# Patient Record
Sex: MALE | Race: OTHER | NOT HISPANIC OR LATINO | URBAN - METROPOLITAN AREA
[De-identification: names, ages, dates, MRNs, and addresses within clinical notes are randomized per-mention and may not be internally consistent; named-entity substitution may affect disease eponyms.]

---

## 2021-06-09 ENCOUNTER — OUTPATIENT (OUTPATIENT)
Dept: OUTPATIENT SERVICES | Facility: HOSPITAL | Age: 46
LOS: 1 days | End: 2021-06-09
Payer: COMMERCIAL

## 2021-06-09 ENCOUNTER — APPOINTMENT (OUTPATIENT)
Dept: RADIOLOGY | Facility: HOSPITAL | Age: 46
End: 2021-06-09
Payer: COMMERCIAL

## 2021-06-09 DIAGNOSIS — K21.9 GASTRO-ESOPHAGEAL REFLUX DISEASE WITHOUT ESOPHAGITIS: ICD-10-CM

## 2021-06-09 PROCEDURE — 74240 X-RAY XM UPR GI TRC 1CNTRST: CPT | Mod: 26

## 2021-06-09 PROCEDURE — 74240 X-RAY XM UPR GI TRC 1CNTRST: CPT

## 2021-07-22 ENCOUNTER — NON-APPOINTMENT (OUTPATIENT)
Age: 46
End: 2021-07-22

## 2021-09-08 ENCOUNTER — NON-APPOINTMENT (OUTPATIENT)
Age: 46
End: 2021-09-08

## 2021-09-13 ENCOUNTER — NON-APPOINTMENT (OUTPATIENT)
Age: 46
End: 2021-09-13

## 2021-09-13 DIAGNOSIS — Z01.818 ENCOUNTER FOR OTHER PREPROCEDURAL EXAMINATION: ICD-10-CM

## 2021-09-13 DIAGNOSIS — K44.9 DIAPHRAGMATIC HERNIA W/OUT OBSTRUCTION OR GANGRENE: ICD-10-CM

## 2021-09-13 DIAGNOSIS — K21.9 GASTRO-ESOPHAGEAL REFLUX DISEASE W/OUT ESOPHAGITIS: ICD-10-CM

## 2021-09-20 ENCOUNTER — OUTPATIENT (OUTPATIENT)
Dept: OUTPATIENT SERVICES | Facility: HOSPITAL | Age: 46
LOS: 1 days | Discharge: HOME | End: 2021-09-20
Payer: COMMERCIAL

## 2021-09-20 ENCOUNTER — APPOINTMENT (OUTPATIENT)
Dept: GASTROENTEROLOGY | Facility: HOSPITAL | Age: 46
End: 2021-09-20

## 2021-09-20 VITALS
HEART RATE: 66 BPM | TEMPERATURE: 99 F | SYSTOLIC BLOOD PRESSURE: 118 MMHG | RESPIRATION RATE: 20 BRPM | OXYGEN SATURATION: 99 % | DIASTOLIC BLOOD PRESSURE: 73 MMHG

## 2021-09-20 VITALS
WEIGHT: 160.06 LBS | HEIGHT: 72 IN | TEMPERATURE: 97 F | OXYGEN SATURATION: 99 % | DIASTOLIC BLOOD PRESSURE: 73 MMHG | SYSTOLIC BLOOD PRESSURE: 118 MMHG

## 2021-09-20 DIAGNOSIS — K44.9 DIAPHRAGMATIC HERNIA WITHOUT OBSTRUCTION OR GANGRENE: ICD-10-CM

## 2021-09-20 PROCEDURE — 91037 ESOPH IMPED FUNCTION TEST: CPT | Mod: 26,GC

## 2021-09-20 PROCEDURE — 91010 ESOPHAGUS MOTILITY STUDY: CPT | Mod: 26,GC

## 2021-09-20 NOTE — ASU PATIENT PROFILE, ADULT - PATIENT REPRESENTATIVE: ( YOU CAN CHOOSE ANY PERSON THAT CAN ASSIST YOU WITH YOUR HEALTH CARE PREFERENCES, DOES NOT HAVE TO BE A SPOUSE, IMMEDIATE FAMILY OR SIGNIFICANT OTHER/PARTNER)
"Anesthesia Transfer of Care Note    Patient: Indira Chauhan    Procedure(s) Performed: Procedure(s) (LRB):  Fnfzcbmij-Gurh-I-Cath (Right)    Patient location: PACU    Anesthesia Type: MAC    Transport from OR: Transported from OR on 2-3 L/min O2 by NC with adequate spontaneous ventilation    Post pain: adequate analgesia    Post assessment: no apparent anesthetic complications and tolerated procedure well    Post vital signs: stable    Level of consciousness: awake    Nausea/Vomiting: no nausea/vomiting    Complications: none    Transfer of care protocol was followed      Last vitals:   Visit Vitals  BP (!) 116/58   Pulse 71   Temp 36.7 °C (98.1 °F) (Skin)   Resp 16   Ht 5' 5" (1.651 m)   Wt 99.8 kg (220 lb)   SpO2 96%   Breastfeeding? No   BMI 36.61 kg/m²     " Declines

## 2021-09-24 ENCOUNTER — RESULT REVIEW (OUTPATIENT)
Age: 46
End: 2021-09-24

## 2021-09-24 ENCOUNTER — APPOINTMENT (OUTPATIENT)
Dept: GASTROENTEROLOGY | Facility: HOSPITAL | Age: 46
End: 2021-09-24

## 2021-09-24 ENCOUNTER — OUTPATIENT (OUTPATIENT)
Dept: OUTPATIENT SERVICES | Facility: HOSPITAL | Age: 46
LOS: 1 days | Discharge: HOME | End: 2021-09-24
Payer: COMMERCIAL

## 2021-09-24 VITALS
HEIGHT: 72 IN | WEIGHT: 160.06 LBS | TEMPERATURE: 98 F | HEART RATE: 56 BPM | DIASTOLIC BLOOD PRESSURE: 81 MMHG | SYSTOLIC BLOOD PRESSURE: 123 MMHG | OXYGEN SATURATION: 99 % | RESPIRATION RATE: 12 BRPM

## 2021-09-24 VITALS
RESPIRATION RATE: 18 BRPM | OXYGEN SATURATION: 98 % | SYSTOLIC BLOOD PRESSURE: 106 MMHG | DIASTOLIC BLOOD PRESSURE: 79 MMHG | HEART RATE: 60 BPM

## 2021-09-24 PROCEDURE — 88305 TISSUE EXAM BY PATHOLOGIST: CPT | Mod: 26

## 2021-09-24 PROCEDURE — 43239 EGD BIOPSY SINGLE/MULTIPLE: CPT | Mod: GC

## 2021-09-24 PROCEDURE — 91040 ESOPH BALLOON DISTENSION TST: CPT | Mod: 26,GC

## 2021-09-27 LAB — SURGICAL PATHOLOGY STUDY: SIGNIFICANT CHANGE UP

## 2021-11-04 PROBLEM — E83.119 HEMOCHROMATOSIS, UNSPECIFIED: Chronic | Status: ACTIVE | Noted: 2021-09-24

## 2021-11-09 ENCOUNTER — OUTPATIENT (OUTPATIENT)
Dept: OUTPATIENT SERVICES | Facility: HOSPITAL | Age: 46
LOS: 1 days | End: 2021-11-09

## 2021-11-09 VITALS
DIASTOLIC BLOOD PRESSURE: 80 MMHG | HEART RATE: 80 BPM | SYSTOLIC BLOOD PRESSURE: 122 MMHG | WEIGHT: 158.07 LBS | OXYGEN SATURATION: 97 % | RESPIRATION RATE: 16 BRPM | TEMPERATURE: 95 F | HEIGHT: 72 IN

## 2021-11-09 DIAGNOSIS — R52 PAIN, UNSPECIFIED: ICD-10-CM

## 2021-11-09 DIAGNOSIS — K21.9 GASTRO-ESOPHAGEAL REFLUX DISEASE WITHOUT ESOPHAGITIS: ICD-10-CM

## 2021-11-09 DIAGNOSIS — K44.9 DIAPHRAGMATIC HERNIA WITHOUT OBSTRUCTION OR GANGRENE: ICD-10-CM

## 2021-11-09 LAB
ANION GAP SERPL CALC-SCNC: 7 MMOL/L — SIGNIFICANT CHANGE UP (ref 7–14)
BLD GP AB SCN SERPL QL: NEGATIVE — SIGNIFICANT CHANGE UP
BUN SERPL-MCNC: 17 MG/DL — SIGNIFICANT CHANGE UP (ref 7–23)
CALCIUM SERPL-MCNC: 9 MG/DL — SIGNIFICANT CHANGE UP (ref 8.4–10.5)
CHLORIDE SERPL-SCNC: 104 MMOL/L — SIGNIFICANT CHANGE UP (ref 98–107)
CO2 SERPL-SCNC: 28 MMOL/L — SIGNIFICANT CHANGE UP (ref 22–31)
CREAT SERPL-MCNC: 0.89 MG/DL — SIGNIFICANT CHANGE UP (ref 0.5–1.3)
GLUCOSE SERPL-MCNC: 88 MG/DL — SIGNIFICANT CHANGE UP (ref 70–99)
HCT VFR BLD CALC: 43.8 % — SIGNIFICANT CHANGE UP (ref 39–50)
HGB BLD-MCNC: 14.3 G/DL — SIGNIFICANT CHANGE UP (ref 13–17)
MCHC RBC-ENTMCNC: 31.9 PG — SIGNIFICANT CHANGE UP (ref 27–34)
MCHC RBC-ENTMCNC: 32.6 GM/DL — SIGNIFICANT CHANGE UP (ref 32–36)
MCV RBC AUTO: 97.8 FL — SIGNIFICANT CHANGE UP (ref 80–100)
NRBC # BLD: 0 /100 WBCS — SIGNIFICANT CHANGE UP
NRBC # FLD: 0 K/UL — SIGNIFICANT CHANGE UP
PLATELET # BLD AUTO: 209 K/UL — SIGNIFICANT CHANGE UP (ref 150–400)
POTASSIUM SERPL-MCNC: 4 MMOL/L — SIGNIFICANT CHANGE UP (ref 3.5–5.3)
POTASSIUM SERPL-SCNC: 4 MMOL/L — SIGNIFICANT CHANGE UP (ref 3.5–5.3)
RBC # BLD: 4.48 M/UL — SIGNIFICANT CHANGE UP (ref 4.2–5.8)
RBC # FLD: 12.1 % — SIGNIFICANT CHANGE UP (ref 10.3–14.5)
RH IG SCN BLD-IMP: POSITIVE — SIGNIFICANT CHANGE UP
SODIUM SERPL-SCNC: 139 MMOL/L — SIGNIFICANT CHANGE UP (ref 135–145)
WBC # BLD: 3.56 K/UL — LOW (ref 3.8–10.5)
WBC # FLD AUTO: 3.56 K/UL — LOW (ref 3.8–10.5)

## 2021-11-09 RX ORDER — SODIUM CHLORIDE 9 MG/ML
1000 INJECTION, SOLUTION INTRAVENOUS
Refills: 0 | Status: DISCONTINUED | OUTPATIENT
Start: 2021-11-24 | End: 2021-11-24

## 2021-11-09 NOTE — H&P PST ADULT - HISTORY OF PRESENT ILLNESS
This is a 47 y/o male who presents with progressively worsening symptomatology of hiatal hernia. Visited.  MD recommended intervention. Scheduled for lap robotic hiatal hernia repair, fundoplication and esophagogastroduodenoscopy.  This is a 47 y/o male who presents with progressively worsening symptomatology of hiatal hernia. Visited doctor with subsequent scoping confirming pathology. MD recommended intervention. Scheduled for lap robotic hiatal hernia repair, fundoplication and esophagogastroduodenoscopy.

## 2021-11-09 NOTE — H&P PST ADULT - NSANTHOSAYNRD_GEN_A_CORE
No. FAWN screening performed.  STOP BANG Legend: 0-2 = LOW Risk; 3-4 = INTERMEDIATE Risk; 5-8 = HIGH Risk

## 2021-11-09 NOTE — H&P PST ADULT - NSICDXPASTMEDICALHX_GEN_ALL_CORE_FT
PAST MEDICAL HISTORY:  Hemochromatosis     Hiatal hernia with GERD     Vitamin B12 deficiency     WBC decreased

## 2021-11-09 NOTE — H&P PST ADULT - PROBLEM SELECTOR PLAN 1
This is a 47 y/o male who is scheduled for lap robotic hiatal hernia repair This is a 47 y/o male who is scheduled for lap robotic hiatal hernia repair on 11-24-21  * Instructed to speak with surgeon regarding covid testing  * Given preop and cleanser instructions with good teach back and patient verbalized understanding

## 2021-11-22 ENCOUNTER — APPOINTMENT (OUTPATIENT)
Dept: DISASTER EMERGENCY | Facility: CLINIC | Age: 46
End: 2021-11-22

## 2021-11-23 ENCOUNTER — TRANSCRIPTION ENCOUNTER (OUTPATIENT)
Age: 46
End: 2021-11-23

## 2021-11-23 ENCOUNTER — NON-APPOINTMENT (OUTPATIENT)
Age: 46
End: 2021-11-23

## 2021-11-23 LAB — SARS-COV-2 N GENE NPH QL NAA+PROBE: NOT DETECTED

## 2021-11-24 ENCOUNTER — INPATIENT (INPATIENT)
Facility: HOSPITAL | Age: 46
LOS: 1 days | Discharge: HOME | End: 2021-11-26
Attending: SURGERY | Admitting: SURGERY
Payer: COMMERCIAL

## 2021-11-24 VITALS
OXYGEN SATURATION: 99 % | HEART RATE: 59 BPM | RESPIRATION RATE: 12 BRPM | SYSTOLIC BLOOD PRESSURE: 119 MMHG | TEMPERATURE: 98 F | DIASTOLIC BLOOD PRESSURE: 81 MMHG | HEIGHT: 72 IN | WEIGHT: 156.09 LBS

## 2021-11-24 DIAGNOSIS — K44.9 DIAPHRAGMATIC HERNIA WITHOUT OBSTRUCTION OR GANGRENE: ICD-10-CM

## 2021-11-24 LAB
ANION GAP SERPL CALC-SCNC: 9 MMOL/L — SIGNIFICANT CHANGE UP (ref 7–14)
BUN SERPL-MCNC: 16 MG/DL — SIGNIFICANT CHANGE UP (ref 7–23)
CALCIUM SERPL-MCNC: 8.8 MG/DL — SIGNIFICANT CHANGE UP (ref 8.4–10.5)
CHLORIDE SERPL-SCNC: 104 MMOL/L — SIGNIFICANT CHANGE UP (ref 98–107)
CO2 SERPL-SCNC: 26 MMOL/L — SIGNIFICANT CHANGE UP (ref 22–31)
CREAT SERPL-MCNC: 1.05 MG/DL — SIGNIFICANT CHANGE UP (ref 0.5–1.3)
GLUCOSE SERPL-MCNC: 165 MG/DL — HIGH (ref 70–99)
HCT VFR BLD CALC: 43.3 % — SIGNIFICANT CHANGE UP (ref 39–50)
HGB BLD-MCNC: 14.3 G/DL — SIGNIFICANT CHANGE UP (ref 13–17)
MAGNESIUM SERPL-MCNC: 1.8 MG/DL — SIGNIFICANT CHANGE UP (ref 1.6–2.6)
MCHC RBC-ENTMCNC: 32 PG — SIGNIFICANT CHANGE UP (ref 27–34)
MCHC RBC-ENTMCNC: 33 GM/DL — SIGNIFICANT CHANGE UP (ref 32–36)
MCV RBC AUTO: 96.9 FL — SIGNIFICANT CHANGE UP (ref 80–100)
NRBC # BLD: 0 /100 WBCS — SIGNIFICANT CHANGE UP
NRBC # FLD: 0 K/UL — SIGNIFICANT CHANGE UP
PHOSPHATE SERPL-MCNC: 3.3 MG/DL — SIGNIFICANT CHANGE UP (ref 2.5–4.5)
PLATELET # BLD AUTO: 190 K/UL — SIGNIFICANT CHANGE UP (ref 150–400)
POTASSIUM SERPL-MCNC: 4.3 MMOL/L — SIGNIFICANT CHANGE UP (ref 3.5–5.3)
POTASSIUM SERPL-SCNC: 4.3 MMOL/L — SIGNIFICANT CHANGE UP (ref 3.5–5.3)
RBC # BLD: 4.47 M/UL — SIGNIFICANT CHANGE UP (ref 4.2–5.8)
RBC # FLD: 12.1 % — SIGNIFICANT CHANGE UP (ref 10.3–14.5)
SODIUM SERPL-SCNC: 139 MMOL/L — SIGNIFICANT CHANGE UP (ref 135–145)
WBC # BLD: 14.12 K/UL — HIGH (ref 3.8–10.5)
WBC # FLD AUTO: 14.12 K/UL — HIGH (ref 3.8–10.5)

## 2021-11-24 PROCEDURE — 93010 ELECTROCARDIOGRAM REPORT: CPT

## 2021-11-24 PROCEDURE — 71045 X-RAY EXAM CHEST 1 VIEW: CPT | Mod: 26,76

## 2021-11-24 RX ORDER — KETOROLAC TROMETHAMINE 30 MG/ML
15 SYRINGE (ML) INJECTION EVERY 6 HOURS
Refills: 0 | Status: DISCONTINUED | OUTPATIENT
Start: 2021-11-24 | End: 2021-11-26

## 2021-11-24 RX ORDER — SIMETHICONE 80 MG/1
80 TABLET, CHEWABLE ORAL ONCE
Refills: 0 | Status: DISCONTINUED | OUTPATIENT
Start: 2021-11-24 | End: 2021-11-26

## 2021-11-24 RX ORDER — ACETAMINOPHEN 500 MG
1000 TABLET ORAL EVERY 6 HOURS
Refills: 0 | Status: COMPLETED | OUTPATIENT
Start: 2021-11-24 | End: 2021-11-25

## 2021-11-24 RX ORDER — SODIUM CHLORIDE 9 MG/ML
1000 INJECTION, SOLUTION INTRAVENOUS
Refills: 0 | Status: DISCONTINUED | OUTPATIENT
Start: 2021-11-24 | End: 2021-11-25

## 2021-11-24 RX ORDER — METOCLOPRAMIDE HCL 10 MG
10 TABLET ORAL ONCE
Refills: 0 | Status: COMPLETED | OUTPATIENT
Start: 2021-11-24 | End: 2021-11-24

## 2021-11-24 RX ORDER — ONDANSETRON 8 MG/1
4 TABLET, FILM COATED ORAL EVERY 6 HOURS
Refills: 0 | Status: DISCONTINUED | OUTPATIENT
Start: 2021-11-24 | End: 2021-11-26

## 2021-11-24 RX ORDER — HYDROMORPHONE HYDROCHLORIDE 2 MG/ML
0.5 INJECTION INTRAMUSCULAR; INTRAVENOUS; SUBCUTANEOUS EVERY 6 HOURS
Refills: 0 | Status: DISCONTINUED | OUTPATIENT
Start: 2021-11-24 | End: 2021-11-26

## 2021-11-24 RX ORDER — ENOXAPARIN SODIUM 100 MG/ML
40 INJECTION SUBCUTANEOUS DAILY
Refills: 0 | Status: DISCONTINUED | OUTPATIENT
Start: 2021-11-25 | End: 2021-11-26

## 2021-11-24 RX ADMIN — Medication 10 MILLIGRAM(S): at 21:40

## 2021-11-24 RX ADMIN — Medication 1000 MILLIGRAM(S): at 21:54

## 2021-11-24 RX ADMIN — HYDROMORPHONE HYDROCHLORIDE 0.5 MILLIGRAM(S): 2 INJECTION INTRAMUSCULAR; INTRAVENOUS; SUBCUTANEOUS at 16:53

## 2021-11-24 RX ADMIN — Medication 400 MILLIGRAM(S): at 20:54

## 2021-11-24 RX ADMIN — ONDANSETRON 4 MILLIGRAM(S): 8 TABLET, FILM COATED ORAL at 18:15

## 2021-11-24 RX ADMIN — HYDROMORPHONE HYDROCHLORIDE 0.5 MILLIGRAM(S): 2 INJECTION INTRAMUSCULAR; INTRAVENOUS; SUBCUTANEOUS at 17:15

## 2021-11-24 RX ADMIN — SODIUM CHLORIDE 75 MILLILITER(S): 9 INJECTION, SOLUTION INTRAVENOUS at 20:55

## 2021-11-24 NOTE — CHART NOTE - NSCHARTNOTEFT_GEN_A_CORE
POST-OPERATIVE NOTE    Subjective:  Patient is s/p robotic hiatal hernia repair. Reports intermittent nausea, without vomiting, and non-radiating, non crushing pleuritic chest pain, however patient denies SOB and is satting 95-97 on RA. Other VSS. CXR obtained demonstrating postoperative pneumoperitoneum and subcutaneus emphysema, both of which are expected i/s/o robotic hiatal hernia repair. ECG obtained, NSR. Denies fevers/chills. Patient counseled on findings of CXR/ECG.     Vital Signs Last 24 Hrs  T(C): 37.2 (24 Nov 2021 19:37), Max: 37.2 (24 Nov 2021 19:37)  T(F): 98.9 (24 Nov 2021 19:37), Max: 98.9 (24 Nov 2021 19:37)  HR: 74 (24 Nov 2021 19:37) (59 - 97)  BP: 141/86 (24 Nov 2021 19:37) (110/65 - 141/86)  BP(mean): 83 (24 Nov 2021 18:30) (74 - 90)  RR: 18 (24 Nov 2021 19:37) (12 - 20)  SpO2: 95% (24 Nov 2021 19:37) (92% - 100%)  I&O's Detail    24 Nov 2021 07:01  -  24 Nov 2021 21:32  --------------------------------------------------------  IN:    Lactated Ringers: 225 mL  Total IN: 225 mL    OUT:  Total OUT: 0 mL    Total NET: 225 mL          PAST MEDICAL & SURGICAL HISTORY:  Hemochromatosis    WBC decreased    Hiatal hernia with GERD    Vitamin B12 deficiency    No significant past surgical history        Physical Exam:   GEN: resting in bed comfortably in NAD, non diaphoretic  HEENT: L inferior eyelid swollen   NECK: trachea midline  CHEST: no increased WOB, palpable crepitus diffusely in chest wall, nontender to palpation  ABD: soft, non-distended, appropriately tender around incisions without rebound tenderness or guarding, dressings w/o strikethrough  EXTR: warm, well-perfused without gross deformities  NEURO: awake, alert     LABS:                        14.3   14.12 )-----------( 190      ( 24 Nov 2021 16:36 )             43.3     11-24    139  |  104  |  16  ----------------------------<  165<H>  4.3   |  26  |  1.05    Ca    8.8      24 Nov 2021 16:36  Phos  3.3     11-24  Mg     1.80     11-24        CAPILLARY BLOOD GLUCOSE          Radiology and Additional Studies:    Assessment:  The patient is a 46y Male who is now several hours post-op from a robotic hiatal hernia repair, recovering well on the floor.    Plan:  - STAT PACU labs wnl  - Zofran ATC  - Reglan PRN   - Monitor O2 saturation   - Monitor for change in quality/quantity chest pain  - CLD  - No Carbonated fluids  - IV Tylenol  - DVT ppx  - OOB and ambulating as tolerated  - F/u AM labs    A Surgery   [id number]

## 2021-11-24 NOTE — BRIEF OPERATIVE NOTE - NSICDXBRIEFPROCEDURE_GEN_ALL_CORE_FT
PROCEDURES:  Robot-assisted herniorrhaphy of hiatal hernia using da Anthony Si 24-Nov-2021 15:51:36  Rico Pandey

## 2021-11-24 NOTE — PACU DISCHARGE NOTE - COMMENTS
A team came by to evaluate patient for swelling post-procedure. Swelling has gone down significantly, pt doing well clinically, ok to send to floor.

## 2021-11-24 NOTE — PACU DISCHARGE NOTE - THE ANESTHESIA ORDERS USED IN THE PACU ORDER SET WILL BE DISCONTINUED UPON TRANSFER OF THIS PATIENT
23-May-2017 10:44 Letter created and will be mailed to patient.  Chris Matson CMA     Statement Selected

## 2021-11-25 LAB
ANION GAP SERPL CALC-SCNC: 11 MMOL/L — SIGNIFICANT CHANGE UP (ref 7–14)
BUN SERPL-MCNC: 14 MG/DL — SIGNIFICANT CHANGE UP (ref 7–23)
CALCIUM SERPL-MCNC: 9 MG/DL — SIGNIFICANT CHANGE UP (ref 8.4–10.5)
CHLORIDE SERPL-SCNC: 99 MMOL/L — SIGNIFICANT CHANGE UP (ref 98–107)
CO2 SERPL-SCNC: 25 MMOL/L — SIGNIFICANT CHANGE UP (ref 22–31)
COVID-19 NUCLEOCAPSID GAM AB INTERP: NEGATIVE — SIGNIFICANT CHANGE UP
COVID-19 NUCLEOCAPSID TOTAL GAM ANTIBODY RESULT: 0.07 INDEX — SIGNIFICANT CHANGE UP
COVID-19 SPIKE DOMAIN AB INTERP: POSITIVE
COVID-19 SPIKE DOMAIN ANTIBODY RESULT: 73.9 U/ML — HIGH
CREAT SERPL-MCNC: 0.85 MG/DL — SIGNIFICANT CHANGE UP (ref 0.5–1.3)
GLUCOSE SERPL-MCNC: 124 MG/DL — HIGH (ref 70–99)
HCT VFR BLD CALC: 40.8 % — SIGNIFICANT CHANGE UP (ref 39–50)
HGB BLD-MCNC: 13.6 G/DL — SIGNIFICANT CHANGE UP (ref 13–17)
MAGNESIUM SERPL-MCNC: 1.8 MG/DL — SIGNIFICANT CHANGE UP (ref 1.6–2.6)
MCHC RBC-ENTMCNC: 32.5 PG — SIGNIFICANT CHANGE UP (ref 27–34)
MCHC RBC-ENTMCNC: 33.3 GM/DL — SIGNIFICANT CHANGE UP (ref 32–36)
MCV RBC AUTO: 97.6 FL — SIGNIFICANT CHANGE UP (ref 80–100)
NRBC # BLD: 0 /100 WBCS — SIGNIFICANT CHANGE UP
NRBC # FLD: 0 K/UL — SIGNIFICANT CHANGE UP
PHOSPHATE SERPL-MCNC: 3.1 MG/DL — SIGNIFICANT CHANGE UP (ref 2.5–4.5)
PLATELET # BLD AUTO: 188 K/UL — SIGNIFICANT CHANGE UP (ref 150–400)
POTASSIUM SERPL-MCNC: 3.9 MMOL/L — SIGNIFICANT CHANGE UP (ref 3.5–5.3)
POTASSIUM SERPL-SCNC: 3.9 MMOL/L — SIGNIFICANT CHANGE UP (ref 3.5–5.3)
RBC # BLD: 4.18 M/UL — LOW (ref 4.2–5.8)
RBC # FLD: 12 % — SIGNIFICANT CHANGE UP (ref 10.3–14.5)
SARS-COV-2 IGG+IGM SERPL QL IA: 0.07 INDEX — SIGNIFICANT CHANGE UP
SARS-COV-2 IGG+IGM SERPL QL IA: 73.9 U/ML — HIGH
SARS-COV-2 IGG+IGM SERPL QL IA: NEGATIVE — SIGNIFICANT CHANGE UP
SARS-COV-2 IGG+IGM SERPL QL IA: POSITIVE
SODIUM SERPL-SCNC: 135 MMOL/L — SIGNIFICANT CHANGE UP (ref 135–145)
WBC # BLD: 13.53 K/UL — HIGH (ref 3.8–10.5)
WBC # FLD AUTO: 13.53 K/UL — HIGH (ref 3.8–10.5)

## 2021-11-25 PROCEDURE — 71045 X-RAY EXAM CHEST 1 VIEW: CPT | Mod: 26

## 2021-11-25 RX ORDER — INFLUENZA VIRUS VACCINE 15; 15; 15; 15 UG/.5ML; UG/.5ML; UG/.5ML; UG/.5ML
0.5 SUSPENSION INTRAMUSCULAR ONCE
Refills: 0 | Status: DISCONTINUED | OUTPATIENT
Start: 2021-11-25 | End: 2021-11-26

## 2021-11-25 RX ORDER — ACETAMINOPHEN 500 MG
1000 TABLET ORAL EVERY 6 HOURS
Refills: 0 | Status: DISCONTINUED | OUTPATIENT
Start: 2021-11-25 | End: 2021-11-26

## 2021-11-25 RX ORDER — CYCLOBENZAPRINE HYDROCHLORIDE 10 MG/1
10 TABLET, FILM COATED ORAL ONCE
Refills: 0 | Status: COMPLETED | OUTPATIENT
Start: 2021-11-25 | End: 2021-11-25

## 2021-11-25 RX ORDER — METOPROLOL TARTRATE 50 MG
5 TABLET ORAL ONCE
Refills: 0 | Status: COMPLETED | OUTPATIENT
Start: 2021-11-25 | End: 2021-11-25

## 2021-11-25 RX ADMIN — Medication 15 MILLIGRAM(S): at 11:43

## 2021-11-25 RX ADMIN — Medication 400 MILLIGRAM(S): at 07:09

## 2021-11-25 RX ADMIN — CYCLOBENZAPRINE HYDROCHLORIDE 10 MILLIGRAM(S): 10 TABLET, FILM COATED ORAL at 12:19

## 2021-11-25 RX ADMIN — Medication 400 MILLIGRAM(S): at 13:20

## 2021-11-25 RX ADMIN — Medication 15 MILLIGRAM(S): at 12:15

## 2021-11-25 RX ADMIN — Medication 400 MILLIGRAM(S): at 01:38

## 2021-11-25 RX ADMIN — Medication 1000 MILLIGRAM(S): at 14:15

## 2021-11-25 RX ADMIN — Medication 1000 MILLIGRAM(S): at 23:00

## 2021-11-25 RX ADMIN — ONDANSETRON 4 MILLIGRAM(S): 8 TABLET, FILM COATED ORAL at 03:46

## 2021-11-25 RX ADMIN — ENOXAPARIN SODIUM 40 MILLIGRAM(S): 100 INJECTION SUBCUTANEOUS at 13:21

## 2021-11-25 RX ADMIN — Medication 5 MILLIGRAM(S): at 01:30

## 2021-11-25 RX ADMIN — Medication 1000 MILLIGRAM(S): at 02:53

## 2021-11-25 RX ADMIN — Medication 1000 MILLIGRAM(S): at 23:37

## 2021-11-25 RX ADMIN — Medication 400 MILLIGRAM(S): at 22:30

## 2021-11-25 NOTE — PROVIDER CONTACT NOTE (OTHER) - SITUATION
patient complaining of chest discomfort, and "difficulty taken a full deep breath" Post-Care Instructions: I reviewed with the patient in detail post-care instructions. Patient is to wear sunprotection, and avoid picking at any of the treated lesions. Pt may apply Vaseline to crusted or scabbing areas. Medical Necessity Clause: This procedure was medically necessary because the lesions that were treated were: Render Post-Care Instructions In Note?: no Medical Necessity Information: It is in your best interest to select a reason for this procedure from the list below. All of these items fulfill various CMS LCD requirements except the new and changing color options. Detail Level: Detailed Consent: The patient's consent was obtained including but not limited to risks of crusting, scabbing, blistering, scarring, darker or lighter pigmentary change, recurrence, incomplete removal and infection.

## 2021-11-25 NOTE — PROGRESS NOTE ADULT - ATTENDING COMMENTS
pt seen and examined with HS on rounds  agree with note above  pod 1 s/p lap robotic HHR, toupet fundoplication, intraop-EGD  c/o mild chest pain   tolerating CLD - adv to fulls  small ptx - likely CO2 related, breathing comfortably  soft, nt, nd no r/g  wounds c/d/i   f/u labs in am   continue to observe for now, rpt cxr in am  likely dc home in am   d/w pt & family @ bedside in detail

## 2021-11-25 NOTE — PROGRESS NOTE ADULT - ASSESSMENT
45 y/o M now s/p robotic hiatal hernia repair recovering on floor.     47 y/o M now s/p robotic hiatal hernia repair recovering on floor.    -CXR this AM to evaluate for increasing PTX  -CLD  -Pain control  -OOB  -Possible d/c later today      [facility]  #[id number]

## 2021-11-25 NOTE — PROVIDER CONTACT NOTE (OTHER) - ASSESSMENT
Pt A&0 X4, right periorbital and neck swelling noted. SPO2 above 96% on room air. Complains of chest discomfort and heaviness. Appears anxious. No acute distress noted
15:25

## 2021-11-25 NOTE — PATIENT PROFILE ADULT - HARM RISK FACTORS
no Benzoyl Peroxide Counseling: Patient counseled that medicine may cause skin irritation and bleach clothing.  In the event of skin irritation, the patient was advised to reduce the amount of the drug applied or use it less frequently.   The patient verbalized understanding of the proper use and possible adverse effects of benzoyl peroxide.  All of the patient's questions and concerns were addressed.

## 2021-11-25 NOTE — PROGRESS NOTE ADULT - SUBJECTIVE AND OBJECTIVE BOX
24h Events:  -c/o chest tightness o/n-CXR demonstrating post operative pneumoperitoneum and pneumomediastinum, both expected i/s/o robotic hiatal hernia repair, ECG NSR, HDS   -BP elevated o/n aw tachycardia, s/p Lopressor 5mg IVP x1       Subjective:   Patient seen at bedside this AM.     Objective:  Vital Signs  T(C): 37.1 (11-25 @ 03:09), Max: 37.2 (11-24 @ 19:37)  HR: 91 (11-25 @ 03:09) (59 - 107)  BP: 136/85 (11-25 @ 03:09) (110/65 - 161/98)  RR: 18 (11-25 @ 03:09) (12 - 20)  SpO2: 100% (11-25 @ 03:09) (92% - 100%)      Physical Exam:  GEN: resting in bed comfortably in NAD, non diaphoretic  HEENT: L inferior eyelid swollen   NECK: trachea midline  CHEST: no increased WOB, palpable crepitus diffusely in chest wall, nontender to palpation  ABD: soft, non-distended, appropriately tender around incisions without rebound tenderness or guarding, dressings w/o strikethrough  EXTR: warm, well-perfused without gross deformities  NEURO: awake, alert     Labs:                        14.3   14.12 )-----------( 190      ( 24 Nov 2021 16:36 )             43.3   11-24    139  |  104  |  16  ----------------------------<  165<H>  4.3   |  26  |  1.05    Ca    8.8      24 Nov 2021 16:36  Phos  3.3     11-24  Mg     1.80     11-24      CAPILLARY BLOOD GLUCOSE          Imaging:     24h Events:  -s/p robotic hiatal hernia repair   -c/o chest tightness o/n-CXR demonstrating post operative pneumoperitoneum and pneumomediastinum, both expected i/s/o robotic hiatal hernia repair, ECG NSR, HDS   -BP elevated o/n aw tachycardia, s/p Lopressor 5mg IVP x1       Subjective:   Patient seen at bedside this AM.     Objective:  Vital Signs  T(C): 37.1 (11-25 @ 03:09), Max: 37.2 (11-24 @ 19:37)  HR: 91 (11-25 @ 03:09) (59 - 107)  BP: 136/85 (11-25 @ 03:09) (110/65 - 161/98)  RR: 18 (11-25 @ 03:09) (12 - 20)  SpO2: 100% (11-25 @ 03:09) (92% - 100%)      Physical Exam:  GEN: resting in bed comfortably in NAD, non diaphoretic  HEENT: L inferior eyelid swollen   NECK: trachea midline  CHEST: no increased WOB, palpable crepitus diffusely in chest wall, nontender to palpation  ABD: soft, non-distended, appropriately tender around incisions without rebound tenderness or guarding, dressings w/o strikethrough  EXTR: warm, well-perfused without gross deformities  NEURO: awake, alert     Labs:                        14.3   14.12 )-----------( 190      ( 24 Nov 2021 16:36 )             43.3   11-24    139  |  104  |  16  ----------------------------<  165<H>  4.3   |  26  |  1.05    Ca    8.8      24 Nov 2021 16:36  Phos  3.3     11-24  Mg     1.80     11-24      CAPILLARY BLOOD GLUCOSE          Imaging:     Subjective:   Patient seen at bedside this AM. POD 1. Patient is endorsing chest pain on inspiration but has not had nausea, vomiting, trouble swallowing. He is tolerating oral fluids well. UOP not recorded. Patient on RA. Tachycardia overnight requiring 5mg lopressor w/ good effect    Objective:  Vital Signs  T(C): 37.1 (11-25 @ 03:09), Max: 37.2 (11-24 @ 19:37)  HR: 91 (11-25 @ 03:09) (59 - 107)  BP: 136/85 (11-25 @ 03:09) (110/65 - 161/98)  RR: 18 (11-25 @ 03:09) (12 - 20)  SpO2: 100% (11-25 @ 03:09) (92% - 100%)      Physical Exam:  GEN: resting in bed comfortably in NAD, non diaphoretic  HEENT: L inferior eyelid swollen   NECK: trachea midline  CHEST: no increased WOB, palpable crepitus diffusely in chest wall, nontender to palpation  ABD: soft, non-distended, appropriately tender around incisions without rebound tenderness or guarding, robotic trocar site dressings w/o strikethrough  EXTR: warm, well-perfused without gross deformities  NEURO: awake, alert     Labs:                        14.3   14.12 )-----------( 190      ( 24 Nov 2021 16:36 )             43.3   11-24    139  |  104  |  16  ----------------------------<  165<H>  4.3   |  26  |  1.05    Ca    8.8      24 Nov 2021 16:36  Phos  3.3     11-24  Mg     1.80     11-24      CAPILLARY BLOOD GLUCOSE          Imaging:

## 2021-11-26 ENCOUNTER — INPATIENT (INPATIENT)
Facility: HOSPITAL | Age: 46
LOS: 2 days | Discharge: HOME | DRG: 948 | End: 2021-11-29
Attending: SURGERY | Admitting: SURGERY
Payer: COMMERCIAL

## 2021-11-26 ENCOUNTER — TRANSCRIPTION ENCOUNTER (OUTPATIENT)
Age: 46
End: 2021-11-26

## 2021-11-26 VITALS
RESPIRATION RATE: 17 BRPM | DIASTOLIC BLOOD PRESSURE: 92 MMHG | HEART RATE: 71 BPM | OXYGEN SATURATION: 98 % | TEMPERATURE: 99 F | SYSTOLIC BLOOD PRESSURE: 137 MMHG

## 2021-11-26 VITALS
HEART RATE: 83 BPM | OXYGEN SATURATION: 97 % | WEIGHT: 160.06 LBS | RESPIRATION RATE: 20 BRPM | HEIGHT: 72 IN | SYSTOLIC BLOOD PRESSURE: 152 MMHG | DIASTOLIC BLOOD PRESSURE: 96 MMHG | TEMPERATURE: 98 F

## 2021-11-26 LAB
ALBUMIN SERPL ELPH-MCNC: 4.4 G/DL — SIGNIFICANT CHANGE UP (ref 3.3–5)
ALP SERPL-CCNC: 52 U/L — SIGNIFICANT CHANGE UP (ref 40–120)
ALT FLD-CCNC: 549 U/L — HIGH (ref 10–45)
ANION GAP SERPL CALC-SCNC: 13 MMOL/L — SIGNIFICANT CHANGE UP (ref 5–17)
ANION GAP SERPL CALC-SCNC: 9 MMOL/L — SIGNIFICANT CHANGE UP (ref 7–14)
APTT BLD: 26.5 SEC — LOW (ref 27.5–35.5)
AST SERPL-CCNC: 276 U/L — HIGH (ref 10–40)
BASOPHILS # BLD AUTO: 0.03 K/UL — SIGNIFICANT CHANGE UP (ref 0–0.2)
BASOPHILS NFR BLD AUTO: 0.3 % — SIGNIFICANT CHANGE UP (ref 0–2)
BILIRUB SERPL-MCNC: 0.6 MG/DL — SIGNIFICANT CHANGE UP (ref 0.2–1.2)
BLD GP AB SCN SERPL QL: NEGATIVE — SIGNIFICANT CHANGE UP
BUN SERPL-MCNC: 13 MG/DL — SIGNIFICANT CHANGE UP (ref 7–23)
BUN SERPL-MCNC: 14 MG/DL — SIGNIFICANT CHANGE UP (ref 7–23)
CALCIUM SERPL-MCNC: 9.1 MG/DL — SIGNIFICANT CHANGE UP (ref 8.4–10.5)
CALCIUM SERPL-MCNC: 9.5 MG/DL — SIGNIFICANT CHANGE UP (ref 8.4–10.5)
CHLORIDE SERPL-SCNC: 101 MMOL/L — SIGNIFICANT CHANGE UP (ref 96–108)
CHLORIDE SERPL-SCNC: 105 MMOL/L — SIGNIFICANT CHANGE UP (ref 98–107)
CO2 SERPL-SCNC: 25 MMOL/L — SIGNIFICANT CHANGE UP (ref 22–31)
CO2 SERPL-SCNC: 26 MMOL/L — SIGNIFICANT CHANGE UP (ref 22–31)
CREAT SERPL-MCNC: 0.87 MG/DL — SIGNIFICANT CHANGE UP (ref 0.5–1.3)
CREAT SERPL-MCNC: 0.91 MG/DL — SIGNIFICANT CHANGE UP (ref 0.5–1.3)
EOSINOPHIL # BLD AUTO: 0.01 K/UL — SIGNIFICANT CHANGE UP (ref 0–0.5)
EOSINOPHIL NFR BLD AUTO: 0.1 % — SIGNIFICANT CHANGE UP (ref 0–6)
GLUCOSE SERPL-MCNC: 103 MG/DL — HIGH (ref 70–99)
GLUCOSE SERPL-MCNC: 105 MG/DL — HIGH (ref 70–99)
HCT VFR BLD CALC: 38.4 % — LOW (ref 39–50)
HCT VFR BLD CALC: 40.4 % — SIGNIFICANT CHANGE UP (ref 39–50)
HGB BLD-MCNC: 13.3 G/DL — SIGNIFICANT CHANGE UP (ref 13–17)
HGB BLD-MCNC: 13.6 G/DL — SIGNIFICANT CHANGE UP (ref 13–17)
IMM GRANULOCYTES NFR BLD AUTO: 0.3 % — SIGNIFICANT CHANGE UP (ref 0–1.5)
INR BLD: 1.27 RATIO — HIGH (ref 0.88–1.16)
LYMPHOCYTES # BLD AUTO: 1.33 K/UL — SIGNIFICANT CHANGE UP (ref 1–3.3)
LYMPHOCYTES # BLD AUTO: 14 % — SIGNIFICANT CHANGE UP (ref 13–44)
MAGNESIUM SERPL-MCNC: 2.1 MG/DL — SIGNIFICANT CHANGE UP (ref 1.6–2.6)
MCHC RBC-ENTMCNC: 32.5 PG — SIGNIFICANT CHANGE UP (ref 27–34)
MCHC RBC-ENTMCNC: 33.1 PG — SIGNIFICANT CHANGE UP (ref 27–34)
MCHC RBC-ENTMCNC: 33.7 GM/DL — SIGNIFICANT CHANGE UP (ref 32–36)
MCHC RBC-ENTMCNC: 34.6 GM/DL — SIGNIFICANT CHANGE UP (ref 32–36)
MCV RBC AUTO: 95.5 FL — SIGNIFICANT CHANGE UP (ref 80–100)
MCV RBC AUTO: 96.4 FL — SIGNIFICANT CHANGE UP (ref 80–100)
MONOCYTES # BLD AUTO: 0.96 K/UL — HIGH (ref 0–0.9)
MONOCYTES NFR BLD AUTO: 10.1 % — SIGNIFICANT CHANGE UP (ref 2–14)
NEUTROPHILS # BLD AUTO: 7.15 K/UL — SIGNIFICANT CHANGE UP (ref 1.8–7.4)
NEUTROPHILS NFR BLD AUTO: 75.2 % — SIGNIFICANT CHANGE UP (ref 43–77)
NRBC # BLD: 0 /100 WBCS — SIGNIFICANT CHANGE UP
NRBC # BLD: 0 /100 WBCS — SIGNIFICANT CHANGE UP (ref 0–0)
NRBC # FLD: 0 K/UL — SIGNIFICANT CHANGE UP
PHOSPHATE SERPL-MCNC: 2.7 MG/DL — SIGNIFICANT CHANGE UP (ref 2.5–4.5)
PLATELET # BLD AUTO: 162 K/UL — SIGNIFICANT CHANGE UP (ref 150–400)
PLATELET # BLD AUTO: 167 K/UL — SIGNIFICANT CHANGE UP (ref 150–400)
POTASSIUM SERPL-MCNC: 4.1 MMOL/L — SIGNIFICANT CHANGE UP (ref 3.5–5.3)
POTASSIUM SERPL-MCNC: 4.1 MMOL/L — SIGNIFICANT CHANGE UP (ref 3.5–5.3)
POTASSIUM SERPL-SCNC: 4.1 MMOL/L — SIGNIFICANT CHANGE UP (ref 3.5–5.3)
POTASSIUM SERPL-SCNC: 4.1 MMOL/L — SIGNIFICANT CHANGE UP (ref 3.5–5.3)
PROT SERPL-MCNC: 7.4 G/DL — SIGNIFICANT CHANGE UP (ref 6–8.3)
PROTHROM AB SERPL-ACNC: 15.1 SEC — HIGH (ref 10.6–13.6)
RBC # BLD: 4.02 M/UL — LOW (ref 4.2–5.8)
RBC # BLD: 4.19 M/UL — LOW (ref 4.2–5.8)
RBC # FLD: 12.3 % — SIGNIFICANT CHANGE UP (ref 10.3–14.5)
RBC # FLD: 12.5 % — SIGNIFICANT CHANGE UP (ref 10.3–14.5)
RH IG SCN BLD-IMP: POSITIVE — SIGNIFICANT CHANGE UP
SODIUM SERPL-SCNC: 139 MMOL/L — SIGNIFICANT CHANGE UP (ref 135–145)
SODIUM SERPL-SCNC: 140 MMOL/L — SIGNIFICANT CHANGE UP (ref 135–145)
WBC # BLD: 7.39 K/UL — SIGNIFICANT CHANGE UP (ref 3.8–10.5)
WBC # BLD: 9.51 K/UL — SIGNIFICANT CHANGE UP (ref 3.8–10.5)
WBC # FLD AUTO: 7.39 K/UL — SIGNIFICANT CHANGE UP (ref 3.8–10.5)
WBC # FLD AUTO: 9.51 K/UL — SIGNIFICANT CHANGE UP (ref 3.8–10.5)

## 2021-11-26 PROCEDURE — 93010 ELECTROCARDIOGRAM REPORT: CPT

## 2021-11-26 PROCEDURE — 71046 X-RAY EXAM CHEST 2 VIEWS: CPT | Mod: 26

## 2021-11-26 PROCEDURE — 99285 EMERGENCY DEPT VISIT HI MDM: CPT | Mod: CS

## 2021-11-26 PROCEDURE — 71275 CT ANGIOGRAPHY CHEST: CPT | Mod: 26,MA

## 2021-11-26 PROCEDURE — 74177 CT ABD & PELVIS W/CONTRAST: CPT | Mod: 26,MA

## 2021-11-26 PROCEDURE — 71045 X-RAY EXAM CHEST 1 VIEW: CPT | Mod: 26,59

## 2021-11-26 RX ORDER — ACETAMINOPHEN 500 MG
30 TABLET ORAL
Qty: 840 | Refills: 0
Start: 2021-11-26 | End: 2021-12-02

## 2021-11-26 RX ORDER — ACETAMINOPHEN 500 MG
1000 TABLET ORAL EVERY 6 HOURS
Refills: 0 | Status: DISCONTINUED | OUTPATIENT
Start: 2021-11-26 | End: 2021-11-26

## 2021-11-26 RX ORDER — OXYCODONE HYDROCHLORIDE 5 MG/1
5 TABLET ORAL EVERY 4 HOURS
Refills: 0 | Status: DISCONTINUED | OUTPATIENT
Start: 2021-11-26 | End: 2021-11-26

## 2021-11-26 RX ORDER — POTASSIUM PHOSPHATE, MONOBASIC POTASSIUM PHOSPHATE, DIBASIC 236; 224 MG/ML; MG/ML
15 INJECTION, SOLUTION INTRAVENOUS ONCE
Refills: 0 | Status: COMPLETED | OUTPATIENT
Start: 2021-11-26 | End: 2021-11-26

## 2021-11-26 RX ORDER — OXYCODONE HYDROCHLORIDE 5 MG/1
10 TABLET ORAL EVERY 4 HOURS
Refills: 0 | Status: DISCONTINUED | OUTPATIENT
Start: 2021-11-26 | End: 2021-11-26

## 2021-11-26 RX ORDER — OXYCODONE HYDROCHLORIDE 5 MG/1
5 TABLET ORAL
Qty: 35 | Refills: 0
Start: 2021-11-26 | End: 2021-12-02

## 2021-11-26 RX ORDER — OXYCODONE HYDROCHLORIDE 5 MG/1
5 TABLET ORAL
Qty: 80 | Refills: 0
Start: 2021-11-26 | End: 2021-11-29

## 2021-11-26 RX ADMIN — Medication 1000 MILLIGRAM(S): at 12:19

## 2021-11-26 RX ADMIN — Medication 400 MILLIGRAM(S): at 06:43

## 2021-11-26 RX ADMIN — Medication 1000 MILLIGRAM(S): at 07:13

## 2021-11-26 RX ADMIN — POTASSIUM PHOSPHATE, MONOBASIC POTASSIUM PHOSPHATE, DIBASIC 62.5 MILLIMOLE(S): 236; 224 INJECTION, SOLUTION INTRAVENOUS at 12:07

## 2021-11-26 RX ADMIN — OXYCODONE HYDROCHLORIDE 5 MILLIGRAM(S): 5 TABLET ORAL at 10:55

## 2021-11-26 RX ADMIN — Medication 1000 MILLIGRAM(S): at 12:07

## 2021-11-26 NOTE — PROGRESS NOTE ADULT - SUBJECTIVE AND OBJECTIVE BOX
24h Events:  No acute events overnight.    Subjective:   Patient seen at bedside this AM.     Objective:  Vital Signs  T(C): 36.7 (11-26 @ 02:18), Max: 37.2 (11-25 @ 05:45)  HR: 88 (11-26 @ 02:18) (75 - 92)  BP: 130/80 (11-26 @ 02:18) (130/80 - 137/86)  RR: 18 (11-26 @ 02:18) (16 - 18)  SpO2: 96% (11-26 @ 02:18) (96% - 100%)  11-25-21 @ 07:01  -  11-26-21 @ 03:38  --------------------------------------------------------  IN:  Total IN: 0 mL    OUT:    Voided (mL): 1350 mL  Total OUT: 1350 mL    Total NET: -1350 mL          Physical Exam:  GEN: resting in bed comfortably in NAD, non diaphoretic  HEENT: L inferior eyelid swollen   NECK: trachea midline  CHEST: no increased WOB, palpable crepitus diffusely in chest wall, nontender to palpation  ABD: soft, non-distended, appropriately tender around incisions without rebound tenderness or guarding, robotic trocar site dressings w/o strikethrough  EXTR: warm, well-perfused without gross deformities  NEURO: awake, alert     Labs:                        13.6   13.53 )-----------( 188      ( 25 Nov 2021 07:58 )             40.8   11-25    135  |  99  |  14  ----------------------------<  124<H>  3.9   |  25  |  0.85    Ca    9.0      25 Nov 2021 07:58  Phos  3.1     11-25  Mg     1.80     11-25      CAPILLARY BLOOD GLUCOSE          Imaging:     Subjective:   Patient seen at bedside this AM. POD 2. VSS, AF. Patient with some continued pain on inspiration, relieved by Tylenol. Requesting PO oxy PRN. Tolerating CLD, passing flatus.     Objective:  Vital Signs  T(C): 36.7 (11-26 @ 02:18), Max: 37.2 (11-25 @ 05:45)  HR: 88 (11-26 @ 02:18) (75 - 92)  BP: 130/80 (11-26 @ 02:18) (130/80 - 137/86)  RR: 18 (11-26 @ 02:18) (16 - 18)  SpO2: 96% (11-26 @ 02:18) (96% - 100%)  11-25-21 @ 07:01  -  11-26-21 @ 03:38  --------------------------------------------------------  IN:  Total IN: 0 mL    OUT:    Voided (mL): 1350 mL  Total OUT: 1350 mL    Total NET: -1350 mL          Physical Exam:  GEN: resting in bed comfortably in NAD, non diaphoretic  HEENT: L inferior eyelid swollen, interval improvement  NECK: trachea midline  CHEST: no increased WOB, palpable crepitus diffusely in chest wall, nontender to palpation  ABD: soft, non-distended, appropriately tender around incisions without rebound tenderness or guarding, robotic trocar site dressings w/o strikethrough  EXTR: warm, well-perfused without gross deformities  NEURO: awake, alert     Labs:                        13.6   13.53 )-----------( 188      ( 25 Nov 2021 07:58 )             40.8   11-25    135  |  99  |  14  ----------------------------<  124<H>  3.9   |  25  |  0.85    Ca    9.0      25 Nov 2021 07:58  Phos  3.1     11-25  Mg     1.80     11-25      CAPILLARY BLOOD GLUCOSE          Imaging:

## 2021-11-26 NOTE — PROGRESS NOTE ADULT - NSPROGADDITIONALINFOA_GEN_ALL_CORE
CXR unchanged  denies SOB   no respiratory issues now   OK to D/C home   emergency contact numbers given to PT.     Duane Arvizu MD

## 2021-11-26 NOTE — ED PROVIDER NOTE - PROGRESS NOTE DETAILS
Galdino, PGY3: surgery consulted, requesting CT AP r/o pneumoperitoneum. will obtain CT-PA r/o PE Galdino, PGY3: CT showed b/l small ptx, pneumomediastinum and pneumoperitoneum. surgery paged.

## 2021-11-26 NOTE — ED ADULT NURSE NOTE - OBJECTIVE STATEMENT
46y male, PMH hemachromatosis, recent hiatal hernia surgery 11/23/21, dc today from [facility], pt reports getting home around 2pm, taking pain medications w/o relief, pt reports some "chest tightness", says he has a known pneumothorax, denies HA, chest pain, some positional shortness of breath, min abd pain at lower surgery site, denies n/v/d, surgery site CDI, moves all extremities w/+ pulses, bed in lowest position, comfort and safety provided.

## 2021-11-26 NOTE — ED PROVIDER NOTE - ATTENDING CONTRIBUTION TO CARE
I performed a history and physical exam of the patient and discussed their management with the resident and /or advanced care provider. I reviewed the resident and /or ACP's note and agree with the documented findings and plan of care. My medical decision making and observations are found above.  Lungs decreased on rt, abd soft, + sub Q emphasema at neck

## 2021-11-26 NOTE — ED PROVIDER NOTE - PHYSICAL EXAMINATION
Gen: NAD, non-toxic appearing  Head: normal appearing  HEENT: normal conjunctiva, oral mucosa moist  Lung: no respiratory distress, speaking in full sentences, CTA b/l     CV: regular rate and rhythm, no murmurs  Abd: soft, non distended, non tender   MSK: no visible deformities  Neuro: No focal deficits, AAOx3  Skin: crepitus in anterior neck, Warm  Psych: normal affect

## 2021-11-26 NOTE — ED PROVIDER NOTE - OBJECTIVE STATEMENT
47 y/o M w/ h/o recent hernia repair p/w worsening shortness of breath a/w chest tightness. During surgery had complication of PTX and monitored without intervention. No fevers, chills, nausea, vomiting, cough, abdominal pain, back pain, dysuria, numbness, tingling.

## 2021-11-26 NOTE — DISCHARGE NOTE PROVIDER - NSDCFUADDINST_GEN_ALL_CORE_FT
Please followup in clinic with Dr. Shin in 1-2 weeks. You may call the number listed in this document.    Please call Dr. Shin's office if you have an emergency or you need to speak with a provider.    Pain medications have been prescribed for you, please take them as directed.    You may shower as normal and resume a normal diet. Please do not exercise heavily or drink carbonated liquids until you follow up in clinic.

## 2021-11-26 NOTE — DISCHARGE NOTE PROVIDER - NSDCMRMEDTOKEN_GEN_ALL_CORE_FT
acetaminophen 650 mg/20.3 mL oral suspension: 30 milliliter(s) orally every 6 hours MDD:120mL  oxyCODONE 5 mg/5 mL oral solution: 5 milliliter(s) orally prn MDD:5mL   Vitamin B12 injection weekly (given to patient by his hematologist:   Vitamin D 2000 IU orally daily:

## 2021-11-26 NOTE — DISCHARGE NOTE PROVIDER - CARE PROVIDER_API CALL
Owen Shin)  Surgery  [address] [facility] [address]  [place] [address]  Phone: [phone]  Fax: [phone]  Follow Up Time:

## 2021-11-26 NOTE — ED PROVIDER NOTE - RAPID ASSESSMENT
45 y/o M presents to the ED c/o worsening shortness of breath w/ chest tightness. Pt was discharged today from [facility] for hiatal hernia surgery with pneumothorax. Pt presents due to worsening symptoms. Pt took oxycodone today. Pt is well appearing in triage. No other acute complaints.     Nette KELSEY) have documented this rapid assessment note under the dictation of Odell LEONE) which has been reviewed and affirmed to be accurate. Patient was seen as a QPA patient. The patient will be seen and further worked up in the main emergency department and their care will be completed by the main emergency department team along with a thorough physical exam. Receiving team will follow up on labs, analgesia, any clinical imaging, reassess and disposition as clinically indicated, all decisions regarding the progression of care will be made at their discretion. 47 y/o M presents to the ED c/o worsening shortness of breath w/ chest tightness. Pt was discharged today from [facility] for hiatal hernia surgery with pneumothorax that was monitored, no intervention performed. Pt presents due to worsening SOB and chest discomfort. Pt took oxycodone today. Pt is well appearing in triage without respiratory distress. No other acute complaints. denies cough, dizziness, LOC    Nette KELSEY (Everett) have documented this rapid assessment note under the dictation of Odell LEONE) which has been reviewed and affirmed to be accurate. Patient was seen as a QPA patient. The patient will be seen and further worked up in the main emergency department and their care will be completed by the main emergency department team along with a thorough physical exam. Receiving team will follow up on labs, analgesia, any clinical imaging, reassess and disposition as clinically indicated, all decisions regarding the progression of care will be made at their discretion.    Odell KELSEY PA-C saw patient as a rapid assessment initially via telemedicine encounter. The rest of care to be performed by the primary ED team. Rapid assessment documented by everett in my presence. I have personally reviewed and approved the note written by the everett. Receiving team will follow up on labs, analgesia, any clinical imaging, and perform reassessment and disposition of the patient as clinically indicated. All decisions regarding the progression of care will be made at their discretion.

## 2021-11-26 NOTE — ED PROVIDER NOTE - CLINICAL SUMMARY MEDICAL DECISION MAKING FREE TEXT BOX
Femi: recent surgery with pneumothorax after. discharged today from [facility] because feeling better. Has had increase SOB and pain since discharge. No fever. Abd without pain. will image and get surg consult

## 2021-11-26 NOTE — DISCHARGE NOTE NURSING/CASE MANAGEMENT/SOCIAL WORK - PATIENT PORTAL LINK FT
You can access the FollowMyHealth Patient Portal offered by [facility] by registering at the following website: [email or web address]. By joining [employer]’s FollowMyHealth portal, you will also be able to view your health information using other applications (apps) compatible with our system.

## 2021-11-26 NOTE — DISCHARGE NOTE NURSING/CASE MANAGEMENT/SOCIAL WORK - NSCORESITESY/N_GEN_A_CORE_RD
Healthy baby No - Follow-up with your pediatrician within 48 hours of discharge.     Routine Home Care Instructions:  - Please call us for help if you feel sad, blue or overwhelmed for more than a few days after discharge  - Umbilical cord care:        - Please keep your baby's cord clean and dry (do not apply alcohol)        - Please keep your baby's diaper below the umbilical cord until it has fallen off (~10-14 days)        - Please do not submerge your baby in a bath until the cord has fallen off (sponge bath instead)    - Continue feeding child on demand with the guideline of at least 8-12 feeds in a 24 hr period    Please contact your pediatrician and return to the hospital if you notice any of the following:   - Fever  (T > 100.4)  - Reduced amount of wet diapers (< 5-6 per day) or no wet diaper in 12 hours  - Increased fussiness, irritability, or crying inconsolably  - Lethargy (excessively sleepy, difficult to arouse)  - Breathing difficulties (noisy breathing, breathing fast, using belly and neck muscles to breath)  - Changes in the baby’s color (yellow, blue, pale, gray)  - Seizure or loss of consciousness

## 2021-11-26 NOTE — DISCHARGE NOTE PROVIDER - HOSPITAL COURSE
The patient was admitted on 11/24 for a robotic hiatal hernia repair procedure with Dr. Shin. Post operatively, the patient had some persistent pain on inspiration but was tolerating fluids well and passing gas. Daily plain films of the chest were obtained which showed a small amount of stable free air in the chest, around the lungs, and in the abdomen, which is normal post operatively. Laboratory values were normal and vitals were stable persistently. On 11/26, the patient endorsed he would like to be discharged with pain medications; at this time, he was in stable condition with minimal pain.

## 2021-11-26 NOTE — PROGRESS NOTE ADULT - ASSESSMENT
45 y/o M now s/p robotic hiatal hernia repair recovering on floor.    -CXR this AM to evaluate for increasing PTX  -CLD  -Pain control  -OOB  -Possible d/c later today      [facility] Surgery  #[id number]     47 y/o M now s/p robotic hiatal hernia repair recovering on floor.    -CXR this AM to evaluate PTX  -CLD  -Pain control, IV Tylenol ATC and PO Oxy PRN  -OOB  -Possible d/c later today      [facility] Surgery  #[id number]     47 y/o M now s/p robotic hiatal hernia repair recovering on floor.    -CXR this AM to evaluate PTX  -CLD  -Pain control, IV Tylenol ATC and PO Oxy PRN  -OOB  -Possible d/c later today      [facility] Surgery  #[id number]    CXR unchanged  denies SOB   no respiratory issues now   OK to D/C home   emergency contact numbers given to PT.     Duane Arvizu MD

## 2021-11-27 DIAGNOSIS — J98.2 INTERSTITIAL EMPHYSEMA: ICD-10-CM

## 2021-11-27 DIAGNOSIS — J93.9 PNEUMOTHORAX, UNSPECIFIED: ICD-10-CM

## 2021-11-27 PROBLEM — E53.8 DEFICIENCY OF OTHER SPECIFIED B GROUP VITAMINS: Chronic | Status: ACTIVE | Noted: 2021-11-09

## 2021-11-27 PROBLEM — D72.819 DECREASED WHITE BLOOD CELL COUNT, UNSPECIFIED: Chronic | Status: ACTIVE | Noted: 2021-11-09

## 2021-11-27 PROBLEM — K21.9 GASTRO-ESOPHAGEAL REFLUX DISEASE WITHOUT ESOPHAGITIS: Chronic | Status: ACTIVE | Noted: 2021-11-09

## 2021-11-27 LAB
ALBUMIN SERPL ELPH-MCNC: 3.5 G/DL — SIGNIFICANT CHANGE UP (ref 3.3–5)
ALBUMIN SERPL ELPH-MCNC: 4.4 G/DL — SIGNIFICANT CHANGE UP (ref 3.3–5)
ALP SERPL-CCNC: 49 U/L — SIGNIFICANT CHANGE UP (ref 40–120)
ALP SERPL-CCNC: 64 U/L — SIGNIFICANT CHANGE UP (ref 40–120)
ALT FLD-CCNC: 446 U/L — HIGH (ref 10–45)
ALT FLD-CCNC: 468 U/L — HIGH (ref 10–45)
ANION GAP SERPL CALC-SCNC: 13 MMOL/L — SIGNIFICANT CHANGE UP (ref 5–17)
AST SERPL-CCNC: 168 U/L — HIGH (ref 10–40)
AST SERPL-CCNC: 194 U/L — HIGH (ref 10–40)
BILIRUB SERPL-MCNC: 0.7 MG/DL — SIGNIFICANT CHANGE UP (ref 0.2–1.2)
BILIRUB SERPL-MCNC: 0.8 MG/DL — SIGNIFICANT CHANGE UP (ref 0.2–1.2)
BUN SERPL-MCNC: 15 MG/DL — SIGNIFICANT CHANGE UP (ref 7–23)
BUN SERPL-MCNC: SIGNIFICANT CHANGE UP MG/DL (ref 7–23)
CALCIUM SERPL-MCNC: 9.5 MG/DL — SIGNIFICANT CHANGE UP (ref 8.4–10.5)
CALCIUM SERPL-MCNC: SIGNIFICANT CHANGE UP (ref 8.4–10.5)
CHLORIDE SERPL-SCNC: 102 MMOL/L — SIGNIFICANT CHANGE UP (ref 96–108)
CHLORIDE SERPL-SCNC: 104 MMOL/L — SIGNIFICANT CHANGE UP (ref 96–108)
CO2 SERPL-SCNC: 25 MMOL/L — SIGNIFICANT CHANGE UP (ref 22–31)
CO2 SERPL-SCNC: SIGNIFICANT CHANGE UP (ref 22–31)
CREAT SERPL-MCNC: 0.7 MG/DL — SIGNIFICANT CHANGE UP (ref 0.5–1.3)
CREAT SERPL-MCNC: 0.81 MG/DL — SIGNIFICANT CHANGE UP (ref 0.5–1.3)
GLUCOSE SERPL-MCNC: 115 MG/DL — HIGH (ref 70–99)
GLUCOSE SERPL-MCNC: 88 MG/DL — SIGNIFICANT CHANGE UP (ref 70–99)
HCT VFR BLD CALC: 40.8 % — SIGNIFICANT CHANGE UP (ref 39–50)
HGB BLD-MCNC: 13.4 G/DL — SIGNIFICANT CHANGE UP (ref 13–17)
MAGNESIUM SERPL-MCNC: 1.9 MG/DL — SIGNIFICANT CHANGE UP (ref 1.6–2.6)
MAGNESIUM SERPL-MCNC: SIGNIFICANT CHANGE UP MG/DL (ref 1.6–2.6)
MCHC RBC-ENTMCNC: 32.2 PG — SIGNIFICANT CHANGE UP (ref 27–34)
MCHC RBC-ENTMCNC: 32.8 GM/DL — SIGNIFICANT CHANGE UP (ref 32–36)
MCV RBC AUTO: 98.1 FL — SIGNIFICANT CHANGE UP (ref 80–100)
NRBC # BLD: 0 /100 WBCS — SIGNIFICANT CHANGE UP (ref 0–0)
PHOSPHATE SERPL-MCNC: 3.4 MG/DL — SIGNIFICANT CHANGE UP (ref 2.5–4.5)
PHOSPHATE SERPL-MCNC: 3.8 MG/DL — SIGNIFICANT CHANGE UP (ref 2.5–4.5)
PLATELET # BLD AUTO: 178 K/UL — SIGNIFICANT CHANGE UP (ref 150–400)
POTASSIUM SERPL-MCNC: 3.9 MMOL/L — SIGNIFICANT CHANGE UP (ref 3.5–5.3)
POTASSIUM SERPL-MCNC: 4.7 MMOL/L — SIGNIFICANT CHANGE UP (ref 3.5–5.3)
POTASSIUM SERPL-SCNC: 3.9 MMOL/L — SIGNIFICANT CHANGE UP (ref 3.5–5.3)
POTASSIUM SERPL-SCNC: 4.7 MMOL/L — SIGNIFICANT CHANGE UP (ref 3.5–5.3)
PROT SERPL-MCNC: 7 G/DL — SIGNIFICANT CHANGE UP (ref 6–8.3)
PROT SERPL-MCNC: SIGNIFICANT CHANGE UP G/DL (ref 6–8.3)
RBC # BLD: 4.16 M/UL — LOW (ref 4.2–5.8)
RBC # FLD: 12.1 % — SIGNIFICANT CHANGE UP (ref 10.3–14.5)
SARS-COV-2 RNA SPEC QL NAA+PROBE: SIGNIFICANT CHANGE UP
SODIUM SERPL-SCNC: 135 MMOL/L — SIGNIFICANT CHANGE UP (ref 135–145)
SODIUM SERPL-SCNC: 140 MMOL/L — SIGNIFICANT CHANGE UP (ref 135–145)
WBC # BLD: 6.68 K/UL — SIGNIFICANT CHANGE UP (ref 3.8–10.5)
WBC # FLD AUTO: 6.68 K/UL — SIGNIFICANT CHANGE UP (ref 3.8–10.5)

## 2021-11-27 RX ORDER — POTASSIUM CHLORIDE 20 MEQ
10 PACKET (EA) ORAL ONCE
Refills: 0 | Status: COMPLETED | OUTPATIENT
Start: 2021-11-27 | End: 2021-11-27

## 2021-11-27 RX ORDER — PANTOPRAZOLE SODIUM 20 MG/1
40 TABLET, DELAYED RELEASE ORAL DAILY
Refills: 0 | Status: DISCONTINUED | OUTPATIENT
Start: 2021-11-27 | End: 2021-11-29

## 2021-11-27 RX ORDER — MAGNESIUM SULFATE 500 MG/ML
2 VIAL (ML) INJECTION ONCE
Refills: 0 | Status: COMPLETED | OUTPATIENT
Start: 2021-11-27 | End: 2021-11-27

## 2021-11-27 RX ORDER — KETOROLAC TROMETHAMINE 30 MG/ML
15 SYRINGE (ML) INJECTION EVERY 6 HOURS
Refills: 0 | Status: DISCONTINUED | OUTPATIENT
Start: 2021-11-27 | End: 2021-11-27

## 2021-11-27 RX ORDER — ENOXAPARIN SODIUM 100 MG/ML
40 INJECTION SUBCUTANEOUS EVERY 24 HOURS
Refills: 0 | Status: DISCONTINUED | OUTPATIENT
Start: 2021-11-27 | End: 2021-11-29

## 2021-11-27 RX ORDER — TRAMADOL HYDROCHLORIDE 50 MG/1
50 TABLET ORAL EVERY 6 HOURS
Refills: 0 | Status: DISCONTINUED | OUTPATIENT
Start: 2021-11-27 | End: 2021-11-29

## 2021-11-27 RX ORDER — TRAMADOL HYDROCHLORIDE 50 MG/1
50 TABLET ORAL EVERY 6 HOURS
Refills: 0 | Status: DISCONTINUED | OUTPATIENT
Start: 2021-11-27 | End: 2021-11-27

## 2021-11-27 RX ORDER — INFLUENZA VIRUS VACCINE 15; 15; 15; 15 UG/.5ML; UG/.5ML; UG/.5ML; UG/.5ML
0.5 SUSPENSION INTRAMUSCULAR ONCE
Refills: 0 | Status: DISCONTINUED | OUTPATIENT
Start: 2021-11-27 | End: 2021-11-29

## 2021-11-27 RX ORDER — TRAMADOL HYDROCHLORIDE 50 MG/1
25 TABLET ORAL EVERY 6 HOURS
Refills: 0 | Status: DISCONTINUED | OUTPATIENT
Start: 2021-11-27 | End: 2021-11-27

## 2021-11-27 RX ORDER — TRAMADOL HYDROCHLORIDE 50 MG/1
25 TABLET ORAL EVERY 6 HOURS
Refills: 0 | Status: DISCONTINUED | OUTPATIENT
Start: 2021-11-27 | End: 2021-11-29

## 2021-11-27 RX ORDER — LANOLIN ALCOHOL/MO/W.PET/CERES
3 CREAM (GRAM) TOPICAL AT BEDTIME
Refills: 0 | Status: DISCONTINUED | OUTPATIENT
Start: 2021-11-27 | End: 2021-11-29

## 2021-11-27 RX ORDER — KETOROLAC TROMETHAMINE 30 MG/ML
15 SYRINGE (ML) INJECTION EVERY 6 HOURS
Refills: 0 | Status: DISCONTINUED | OUTPATIENT
Start: 2021-11-27 | End: 2021-11-29

## 2021-11-27 RX ADMIN — Medication 3 MILLIGRAM(S): at 21:04

## 2021-11-27 RX ADMIN — Medication 50 GRAM(S): at 13:25

## 2021-11-27 RX ADMIN — Medication 10 MILLIEQUIVALENT(S): at 13:25

## 2021-11-27 RX ADMIN — ENOXAPARIN SODIUM 40 MILLIGRAM(S): 100 INJECTION SUBCUTANEOUS at 05:45

## 2021-11-27 RX ADMIN — Medication 15 MILLIGRAM(S): at 06:14

## 2021-11-27 RX ADMIN — Medication 15 MILLIGRAM(S): at 06:44

## 2021-11-27 RX ADMIN — PANTOPRAZOLE SODIUM 40 MILLIGRAM(S): 20 TABLET, DELAYED RELEASE ORAL at 13:25

## 2021-11-27 NOTE — PATIENT PROFILE ADULT - FALLEN IN THE PAST
Discussion/Summary   Your ultrasound of your leg did not show a DVT.  Please follow up with Dr Garrido for further managment        
no

## 2021-11-27 NOTE — CONSULT NOTE ADULT - SUBJECTIVE AND OBJECTIVE BOX
PULMONARY CONSULT    HPI: 47 y/o M with PMH of GERD s/p robot-assisted hiatal hernia repair 11/24 p/w post operative pain and chest tightness. Imaging with pneumomediastinum, pneumothoraces, subcutaneous emphysema. 98% on RA    PAST MEDICAL & SURGICAL HISTORY:  Hemochromatosis    WBC decreased    Hiatal hernia with GERD    Vitamin B12 deficiency    No significant past surgical history      Allergies    No Known Allergies    Intolerances      FAMILY HISTORY: Non-contributory     Social history:     Review of Systems:  CONSTITUTIONAL: No fever, chills, or fatigue  EYES: No eye pain, visual disturbances, or discharge  ENMT:  No difficulty hearing, tinnitus, vertigo; No sinus or throat pain  NECK: No pain or stiffness  RESPIRATORY: Per above  CARDIOVASCULAR: No chest pain, palpitations, dizziness, or leg swelling  GASTROINTESTINAL: No abdominal or epigastric pain. No nausea, vomiting, or hematemesis; No diarrhea or constipation. No melena or hematochezia.  GENITOURINARY: No dysuria, frequency, hematuria, or incontinence  NEUROLOGICAL: No headaches, memory loss, loss of strength, numbness, or tremors  SKIN: No itching, burning, rashes, or lesions   MUSCULOSKELETAL: No joint pain or swelling; No muscle, back, or extremity pain  PSYCHIATRIC: No depression, anxiety, mood swings, or difficulty sleeping      Medications:  MEDICATIONS  (STANDING):  enoxaparin Injectable 40 milliGRAM(s) SubCutaneous every 24 hours  influenza   Vaccine 0.5 milliLiter(s) IntraMuscular once  ketorolac   Injectable 15 milliGRAM(s) IV Push every 6 hours  pantoprazole  Injectable 40 milliGRAM(s) IV Push daily    MEDICATIONS  (PRN):  traMADol 25 milliGRAM(s) Oral every 6 hours PRN Moderate Pain (4 - 6)  traMADol 50 milliGRAM(s) Oral every 6 hours PRN Severe Pain (7 - 10)            Vital Signs Last 24 Hrs  T(C): 37.1 (27 Nov 2021 10:03), Max: 37.1 (27 Nov 2021 10:03)  T(F): 98.7 (27 Nov 2021 10:03), Max: 98.7 (27 Nov 2021 10:03)  HR: 87 (27 Nov 2021 10:03) (73 - 87)  BP: 147/92 (27 Nov 2021 10:03) (140/66 - 152/96)  BP(mean): --  RR: 18 (27 Nov 2021 10:03) (18 - 20)  SpO2: 98% (27 Nov 2021 10:03) (97% - 100%) on RA            11-26 @ 07:01  -  11-27 @ 07:00  --------------------------------------------------------  IN: 0 mL / OUT: 200 mL / NET: -200 mL          LABS:                        13.4   6.68  )-----------( 178      ( 27 Nov 2021 09:27 )             40.8     11-27    140  |  102  |  15  ----------------------------<  115<H>  3.9   |  25  |  0.81    Ca    9.5      27 Nov 2021 09:27  Phos  3.4     11-27  Mg     1.9     11-27    TPro  7.0  /  Alb  4.4  /  TBili  0.7  /  DBili  x   /  AST  168<H>  /  ALT  446<H>  /  AlkPhos  49  11-27          CAPILLARY BLOOD GLUCOSE        PT/INR - ( 26 Nov 2021 22:48 )   PT: 15.1 sec;   INR: 1.27 ratio         PTT - ( 26 Nov 2021 22:48 )  PTT:26.5 sec                  CULTURES: (if applicable)        Physical Examination:    General: No acute distress.      HEENT: Pupils equal, reactive to light.  Symmetric.    PULM: Clear to auscultation bilaterally, no significant sputum production    CVS: S1, S2    ABD: Soft, nondistended, nontender, normoactive bowel sounds, no masses    EXT: No edema, nontender    SKIN: Warm and well perfused, no rashes noted.    NEURO: Alert, oriented, interactive, nonfocal    RADIOLOGY REVIEWED  CTA chest: < from: CT Angio Chest PE Protocol w/ IV Cont (11.26.21 @ 22:08) >  FINDINGS:  CHEST:  LUNGS PLEURA AND LARGE AIRWAYS: Patent central airways. Small bilateral pneumothoraces. Trace bilateral pleural fluid with mild dependent atelectatic changes.  VESSELS: Limited evaluation due to motion and poor opacification of distal branches. No main, right or left pulmonary artery embolism.  HEART: Heart size is normal. No pericardial effusion.  MEDIASTINUM AND AILEEN: Large volume of pneumomediastinum. CHEST WALL AND LOWER NECK: Large volume of subcutaneous emphysema in the bilateral chest extending into the neck.    ABDOMEN AND PELVIS:  LIVER: Within normal limits.  BILE DUCTS: Normal caliber.  GALLBLADDER: Within normal limits.  SPLEEN: Within normal limits.  PANCREAS: Within normal limits.  ADRENALS: Within normal limits.  KIDNEYS/URETERS: Within normal limits.    BLADDER: Small foci of air likely from recent instrumentation otherwise normal.  REPRODUCTIVE ORGANS: Prostate within normal limits.    BOWEL: No bowel obstruction. Appendix is normal. No hiatal hernia.  PERITONEUM: No ascites. Moderate pneumoperitoneum.  VESSELS: Within normal limits.  RETROPERITONEUM/LYMPH NODES: No lymphadenopathy.  ABDOMINAL WALL: Mild amount of subcutaneous air.  BONES: Within normal limits.    IMPRESSION:    No obvious pulmonary embolism however evaluation is markedly limited due to extensive subcutaneous and intrapleural air as well as the patient's arms at their side.    Small bilateral pneumothoraxes.  Trace bilateral pleural effusions with associated atelectatic changes.    Prominent pneumomediastinum.  Moderate pneumoperitoneum.  Extensive bilateral chest and neck subcutaneous emphysema. Mild subcutaneous emphysema in the abdomen.    < end of copied text >   PULMONARY CONSULT    HPI: 47 y/o M with PMH of GERD s/p robot-assisted hiatal hernia repair 11/24 p/w post operative pain and chest tightness. Imaging with pneumomediastinum, pneumothoraces, subcutaneous emphysema. 98% on RA    PAST MEDICAL & SURGICAL HISTORY:  Hemochromatosis    WBC decreased    Hiatal hernia with GERD    Vitamin B12 deficiency    No significant past surgical history      Allergies    No Known Allergies    Intolerances      FAMILY HISTORY: Non-contributory     Social history: Never smoker     Review of Systems:  CONSTITUTIONAL: No fever, chills, or fatigue  EYES: No eye pain, visual disturbances, or discharge  ENMT:  No difficulty hearing, tinnitus, vertigo; No sinus or throat pain  NECK: No pain or stiffness  RESPIRATORY: Per above  CARDIOVASCULAR: No chest pain, palpitations, dizziness, or leg swelling  GASTROINTESTINAL: No abdominal or epigastric pain. No nausea, vomiting, or hematemesis; No diarrhea or constipation. No melena or hematochezia.  GENITOURINARY: No dysuria, frequency, hematuria, or incontinence  NEUROLOGICAL: No headaches, memory loss, loss of strength, numbness, or tremors  SKIN: No itching, burning, rashes, or lesions   MUSCULOSKELETAL: No joint pain or swelling; No muscle, back, or extremity pain  PSYCHIATRIC: No depression, anxiety, mood swings, or difficulty sleeping      Medications:  MEDICATIONS  (STANDING):  enoxaparin Injectable 40 milliGRAM(s) SubCutaneous every 24 hours  influenza   Vaccine 0.5 milliLiter(s) IntraMuscular once  ketorolac   Injectable 15 milliGRAM(s) IV Push every 6 hours  pantoprazole  Injectable 40 milliGRAM(s) IV Push daily    MEDICATIONS  (PRN):  traMADol 25 milliGRAM(s) Oral every 6 hours PRN Moderate Pain (4 - 6)  traMADol 50 milliGRAM(s) Oral every 6 hours PRN Severe Pain (7 - 10)            Vital Signs Last 24 Hrs  T(C): 37.1 (27 Nov 2021 10:03), Max: 37.1 (27 Nov 2021 10:03)  T(F): 98.7 (27 Nov 2021 10:03), Max: 98.7 (27 Nov 2021 10:03)  HR: 87 (27 Nov 2021 10:03) (73 - 87)  BP: 147/92 (27 Nov 2021 10:03) (140/66 - 152/96)  BP(mean): --  RR: 18 (27 Nov 2021 10:03) (18 - 20)  SpO2: 98% (27 Nov 2021 10:03) (97% - 100%) on RA            11-26 @ 07:01  -  11-27 @ 07:00  --------------------------------------------------------  IN: 0 mL / OUT: 200 mL / NET: -200 mL          LABS:                        13.4   6.68  )-----------( 178      ( 27 Nov 2021 09:27 )             40.8     11-27    140  |  102  |  15  ----------------------------<  115<H>  3.9   |  25  |  0.81    Ca    9.5      27 Nov 2021 09:27  Phos  3.4     11-27  Mg     1.9     11-27    TPro  7.0  /  Alb  4.4  /  TBili  0.7  /  DBili  x   /  AST  168<H>  /  ALT  446<H>  /  AlkPhos  49  11-27          CAPILLARY BLOOD GLUCOSE        PT/INR - ( 26 Nov 2021 22:48 )   PT: 15.1 sec;   INR: 1.27 ratio         PTT - ( 26 Nov 2021 22:48 )  PTT:26.5 sec                  CULTURES: (if applicable)        Physical Examination:    General: No acute distress.      HEENT: Pupils equal, reactive to light.  Symmetric.    PULM: Mildly decreased BS    CVS: S1, S2    ABD: Soft, nondistended, nontender, normoactive bowel sounds, no masses    EXT: No edema, nontender    SKIN: SQ air over anterior chest/neck    NEURO: Alert, oriented, interactive, nonfocal    RADIOLOGY REVIEWED  CTA chest: < from: CT Angio Chest PE Protocol w/ IV Cont (11.26.21 @ 22:08) >  FINDINGS:  CHEST:  LUNGS PLEURA AND LARGE AIRWAYS: Patent central airways. Small bilateral pneumothoraces. Trace bilateral pleural fluid with mild dependent atelectatic changes.  VESSELS: Limited evaluation due to motion and poor opacification of distal branches. No main, right or left pulmonary artery embolism.  HEART: Heart size is normal. No pericardial effusion.  MEDIASTINUM AND AILEEN: Large volume of pneumomediastinum. CHEST WALL AND LOWER NECK: Large volume of subcutaneous emphysema in the bilateral chest extending into the neck.    ABDOMEN AND PELVIS:  LIVER: Within normal limits.  BILE DUCTS: Normal caliber.  GALLBLADDER: Within normal limits.  SPLEEN: Within normal limits.  PANCREAS: Within normal limits.  ADRENALS: Within normal limits.  KIDNEYS/URETERS: Within normal limits.    BLADDER: Small foci of air likely from recent instrumentation otherwise normal.  REPRODUCTIVE ORGANS: Prostate within normal limits.    BOWEL: No bowel obstruction. Appendix is normal. No hiatal hernia.  PERITONEUM: No ascites. Moderate pneumoperitoneum.  VESSELS: Within normal limits.  RETROPERITONEUM/LYMPH NODES: No lymphadenopathy.  ABDOMINAL WALL: Mild amount of subcutaneous air.  BONES: Within normal limits.    IMPRESSION:    No obvious pulmonary embolism however evaluation is markedly limited due to extensive subcutaneous and intrapleural air as well as the patient's arms at their side.    Small bilateral pneumothoraxes.  Trace bilateral pleural effusions with associated atelectatic changes.    Prominent pneumomediastinum.  Moderate pneumoperitoneum.  Extensive bilateral chest and neck subcutaneous emphysema. Mild subcutaneous emphysema in the abdomen.    < end of copied text >

## 2021-11-27 NOTE — CONSULT NOTE ADULT - ASSESSMENT
45 y/o M with PMH of GERD s/p robot-assisted hiatal hernia repair 11/24 p/w post operative pain and chest tightness. Imaging with pneumomediastinum, pneumothoraces, subcutaneous emphysema. 98% on RA
 46M pmh GERD hiatal hernia s/p robot-assisted hiatal hernia repair 11/24 p/w post operative pain and chest tightness.    pneumomediastinum  pneumoperitoneum  pt know to our practice, follows with Dr. Grey  pt d/[facility]man from [facility] yesterday   s/p hiatal hernia repair 11/24  CT demonstrating Prominent pneumomediastinum and Moderate pneumoperitoneum  admitted to surgery   pain control as per sx  diet as per surgery   LFTs elevated, hold tylenol  will follow    Advanced care planning was discussed with patient and family.  Advanced care planning forms were reviewed and discussed.  Risks, benefits and alternatives of gastroenterologic procedures were discussed in detail and all questions were answered.    30 minutes spent.

## 2021-11-27 NOTE — CONSULT NOTE ADULT - ATTENDING COMMENTS
Pt in bed wo resp distress, with slowly resolving sq emphysema, pt showed me a photo from 2 days ago with sq air under rt eye, no longer present.  b/l ptx and pneumomediastinum, pt anxious with 'heavy' chest.  Suggest pain/anxiety control, would continue observation only at this point  Consider thoracic consult  dw pt, wife and surgeon

## 2021-11-27 NOTE — H&P ADULT - ASSESSMENT
46M s/p hiatal hernia repair robot-assisted with stable- appearing pneumoperitoneum, pneumothoraces. Admit for observation and pain management.    Plan:  - Admit to Dr. Shin  - Pain control toradol and tramadol (crush and place in apple sauce)  - Protonix while on NSAIDs  - Holding tylenol given transaminase elevation  - Full liquid diet  - am labs  - CXR in am fu    Discussed with Dr. Owen Shin.    [facility]  [id number]  Mathieu Mccain

## 2021-11-27 NOTE — CONSULT NOTE ADULT - SUBJECTIVE AND OBJECTIVE BOX
[facility]  Sai Redding PA-C  Counts include 234 beds at the [facility] [place] [address]  [phone]      Chief Complaint:  Patient is a 46y old  Male who presents with a chief complaint of     HPI: 46M pmh GERD hiatal hernia s/p robot-assisted hiatal hernia repair 11/24 p/w post operative pain and chest tightness.    The patient reports he felt pain control was adequate before leaving the hospital 11/26. At home he tried liquid tylenol (100cc) which was too sweet for him to tolerate and could not take the full dose (at 6pm). The patient took oxycodone at 4:30pm and felt the pain relief 90min later, felt dizzy. He was also reporting feelings of chest tightness, difficulty taking full breaths. Due to concern that he would have trouble if he passed out at home he came to the hospital. At the hospital he was okay at rest without shortness of breath.    Denies f/n/c, chest pain, abdominal pain, n/v. He was tolerating soft foods like pudding and other liquids      Allergies:  No Known Allergies      Medications:  enoxaparin Injectable 40 milliGRAM(s) SubCutaneous every 24 hours  influenza   Vaccine 0.5 milliLiter(s) IntraMuscular once  ketorolac   Injectable 15 milliGRAM(s) IV Push every 6 hours  pantoprazole  Injectable 40 milliGRAM(s) IV Push daily  traMADol 25 milliGRAM(s) Oral every 6 hours PRN  traMADol 50 milliGRAM(s) Oral every 6 hours PRN      PMHX/PSHX:  Hemochromatosis    WBC decreased    Hiatal hernia with GERD    Vitamin B12 deficiency    No significant past surgical history        Family history:  No pertinent family history in first degree relatives        Social History:     ROS:     General:  No wt loss, fevers, chills, night sweats, fatigue,   Eyes:  Good vision, no reported pain  ENT:  No sore throat, pain, runny nose, dysphagia  CV:  + pain, palpitations, hypo/hypertension  Resp:  + dyspnea, cough, tachypnea, wheezing  GI:  No pain, No nausea, No vomiting, No diarrhea, No constipation, No weight loss, No fever, No pruritis, No rectal bleeding, No tarry stools, No dysphagia,  :  No pain, bleeding, incontinence, nocturia  Muscle:  No pain, weakness  Neuro:  No weakness, tingling, memory problems  Psych:  No fatigue, insomnia, mood problems, depression  Endocrine:  No polyuria, polydipsia, cold/heat intolerance  Heme:  No petechiae, ecchymosis, easy bruisability  Skin:  No rash, tattoos, scars, edema      PHYSICAL EXAM:   Vital Signs:  Vital Signs Last 24 Hrs  T(C): 37.1 (27 Nov 2021 10:03), Max: 37.1 (27 Nov 2021 10:03)  T(F): 98.7 (27 Nov 2021 10:03), Max: 98.7 (27 Nov 2021 10:03)  HR: 87 (27 Nov 2021 10:03) (73 - 87)  BP: 147/92 (27 Nov 2021 10:03) (140/66 - 152/96)  BP(mean): --  RR: 18 (27 Nov 2021 10:03) (18 - 20)  SpO2: 98% (27 Nov 2021 10:03) (97% - 100%)  Daily Height in cm: 182.88 (26 Nov 2021 19:24)    Daily     GENERAL:  Appears stated age,   HEENT:  NC/AT,    CHEST:  Full & symmetric excursion,   HEART:  Regular rhythm  ABDOMEN:  Soft, non-tender, non-distended,   EXTEREMITIES:  no cyanosis,clubbing or edema  SKIN:  No rash  NEURO:  Alert,    LABS:                        13.4   6.68  )-----------( 178      ( 27 Nov 2021 09:27 )             40.8     11-27    140  |  102  |  15  ----------------------------<  115<H>  3.9   |  25  |  0.81    Ca    9.5      27 Nov 2021 09:27  Phos  3.4     11-27  Mg     1.9     11-27    TPro  7.0  /  Alb  4.4  /  TBili  0.7  /  DBili  x   /  AST  168<H>  /  ALT  446<H>  /  AlkPhos  49  11-27    LIVER FUNCTIONS - ( 27 Nov 2021 09:27 )  Alb: 4.4 g/dL / Pro: 7.0 g/dL / ALK PHOS: 49 U/L / ALT: 446 U/L / AST: 168 U/L / GGT: x           PT/INR - ( 26 Nov 2021 22:48 )   PT: 15.1 sec;   INR: 1.27 ratio         PTT - ( 26 Nov 2021 22:48 )  PTT:26.5 sec        Imaging:    < from: CT Abdomen and Pelvis w/ IV Cont (11.26.21 @ 22:23) >    EXAM:  CT ABDOMEN AND PELVIS IC                          EXAM:  CT ANGIO CHEST PULM ART [facility]                            PROCEDURE DATE:  11/26/2021            INTERPRETATION:  CLINICAL INFORMATION: Shortness of breath. Status post hiatal hernia surgery with known pneumothorax, recently discharged. Evaluate for pulmonary embolism.    COMPARISON: PA and lateral chest x-ray 11/26/2021. Chest x-ray 11/25/2021 and 11/24/2021.    CONTRAST/COMPLICATIONS:  IV Contrast: Omnipaque 350  90 cc administered   10 cc discarded  Oral Contrast: NONE  Complications: None reported at time of study completion    PROCEDURE:  CT Angiography of the Chest was performed followed by portal venous phase imaging of the Abdomen and Pelvis.  Sagittal and coronal reformats were performed as well as 3D (MIP) reconstructions.    FINDINGS:  CHEST:  LUNGS PLEURA AND LARGE AIRWAYS: Patent central airways. Small bilateral pneumothoraces. Trace bilateral pleural fluid with mild dependent atelectatic changes.  VESSELS: Limited evaluation due to motion and poor opacification of distal branches. No main, right or left pulmonary artery embolism.  HEART: Heart size is normal. No pericardial effusion.  MEDIASTINUM AND AILEEN: Large volume of pneumomediastinum. CHEST WALL AND LOWER NECK: Large volume of subcutaneous emphysema in the bilateral chest extending into the neck.    ABDOMEN AND PELVIS:  LIVER: Within normal limits.  BILE DUCTS: Normal caliber.  GALLBLADDER: Within normal limits.  SPLEEN: Within normal limits.  PANCREAS: Within normal limits.  ADRENALS: Within normal limits.  KIDNEYS/URETERS: Within normal limits.    BLADDER: Small foci of air likely from recent instrumentation otherwise normal.  REPRODUCTIVE ORGANS: Prostate within normal limits.    BOWEL: No bowel obstruction. Appendix is normal. No hiatal hernia.  PERITONEUM: No ascites. Moderate pneumoperitoneum.  VESSELS: Within normal limits.  RETROPERITONEUM/LYMPH NODES: No lymphadenopathy.  ABDOMINAL WALL: Mild amount of subcutaneous air.  BONES: Within normal limits.    IMPRESSION:    No obvious pulmonary embolism however evaluation is markedly limited due to extensive subcutaneous and intrapleural air as well as the patient's arms at their side.    Small bilateral pneumothoraxes.  Trace bilateral pleural effusions with associated atelectatic changes.    Prominent pneumomediastinum.  Moderate pneumoperitoneum.  Extensive bilateral chest and neck subcutaneous emphysema. Mild subcutaneous emphysema in the abdomen.    Findings were discussed with Dr. RAMBO Mendoza 11/26/2021 10:36 PM by Dr. Friedman with read back confirmation.    --- End of Report ---        < from: CT Angio Chest PE Protocol w/ IV Cont (11.26.21 @ 22:08) >    EXAM:  CT ABDOMEN AND PELVIS IC                          EXAM:  CT ANGIO CHEST PULM [facility]                            PROCEDURE DATE:  11/26/2021            INTERPRETATION:  CLINICAL INFORMATION: Shortness of breath. Status post hiatal hernia surgery with known pneumothorax, recently discharged. Evaluate for pulmonary embolism.    COMPARISON: PA and lateral chest x-ray 11/26/2021. Chest x-ray 11/25/2021 and 11/24/2021.    CONTRAST/COMPLICATIONS:  IV Contrast: Omnipaque 350  90 cc administered   10 cc discarded  Oral Contrast: NONE  Complications: None reported at time of study completion    PROCEDURE:  CT Angiography of the Chest was performed followed by portal venous phase imaging of the Abdomen and Pelvis.  Sagittal and coronal reformats were performed as well as 3D (MIP) reconstructions.    FINDINGS:  CHEST:  LUNGS PLEURA AND LARGE AIRWAYS: Patent central airways. Small bilateral pneumothoraces. Trace bilateral pleural fluid with mild dependent atelectatic changes.  VESSELS: Limited evaluation due to motion and poor opacification of distal branches. No main, right or left pulmonary artery embolism.  HEART: Heart size is normal. No pericardial effusion.  MEDIASTINUM AND AILEEN: Large volume of pneumomediastinum. CHEST WALL AND LOWER NECK: Large volume of subcutaneous emphysema in the bilateral chest extending into the neck.    ABDOMEN AND PELVIS:  LIVER: Within normal limits.  BILE DUCTS: Normal caliber.  GALLBLADDER: Within normal limits.  SPLEEN: Within normal limits.  PANCREAS: Within normal limits.  ADRENALS: Within normal limits.  KIDNEYS/URETERS: Within normal limits.    BLADDER: Small foci of air likely from recent instrumentation otherwise normal.  REPRODUCTIVE ORGANS: Prostate within normal limits.    BOWEL: No bowel obstruction. Appendix is normal. No hiatal hernia.  PERITONEUM: No ascites. Moderate pneumoperitoneum.  VESSELS: Within normal limits.  RETROPERITONEUM/LYMPH NODES: No lymphadenopathy.  ABDOMINAL WALL: Mild amount of subcutaneous air.  BONES: Within normal limits.    IMPRESSION:    No obvious pulmonary embolism however evaluation is markedly limited due to extensive subcutaneous and intrapleural air as well as the patient's arms at their side.    Small bilateral pneumothoraxes.  Trace bilateral pleural effusions with associated atelectatic changes.    Prominent pneumomediastinum.  Moderate pneumoperitoneum.  Extensive bilateral chest and neck subcutaneous emphysema. Mild subcutaneous emphysema in the abdomen.    Findings were discussed with Dr. RAMBO Mendoza 11/26/2021 10:36 PM by Dr. Friedman with read back confirmation.    --- End of Report ---

## 2021-11-27 NOTE — H&P ADULT - ATTENDING COMMENTS
pt seen and examined  pt chart and imaging reviewed in detail  agree with note above  pod 3 s/p lap robotic HHR, toupet fundoplication, intraop-EGD - was discharged from [facility] yesterday afternoon, had mild SOB and anxiety  @ home, didn't like taste of liquid tylenol and felt oxycodone made him lightheaded and pt returned to [facility] ER c/o mild chest pain on deep inspiration  CTPA negative for PE, + small bilateral ptx, + pneumomediastinum/pneumoperitoneum likely related to positive pressure CO2 insufflation/airSeal.   no other signs of ascites/fluid collection  tolerating fulls no carbonated beverages  admit to red surgery for observation/pain control  no acute surgical intervention at this time  pt seen walking hallway without any SOB, breathing comfortably  crepitus improving,   abd soft, nt, nd no r/g  wounds c/d/i   lfts mildly elevated  f/u labs in am   continue to observe for now, rpt cxr in am  appreciate GI/pulm consults  likely dc home in next day or two if continues to improve.   d/w pt & family @ bedside in detail .

## 2021-11-27 NOTE — H&P ADULT - NSHPPHYSICALEXAM_GEN_ALL_CORE
Vital Signs Last 24 Hrs  T(C): 36.8 (26 Nov 2021 21:10), Max: 37 (26 Nov 2021 10:11)  T(F): 98.2 (26 Nov 2021 21:10), Max: 98.6 (26 Nov 2021 10:11)  HR: 83 (26 Nov 2021 21:10) (71 - 88)  BP: 147/93 (26 Nov 2021 21:10) (130/80 - 152/96)  BP(mean): --  RR: 18 (26 Nov 2021 21:10) (17 - 20)  SpO2: 100% (26 Nov 2021 21:10) (96% - 100%)    General: well developed, well nourished, NAD  Neuro: alert and oriented, no focal deficits, moves all extremities spontaneously  HEENT: NCAT, EOMI, anicteric, mucosa moist  Respiratory: airway patent, decreased sounds in upper lung fields bilaterally, RR in teens on room air  CVS: regular rate and rhythm  Abdomen: soft, nontender, nondistended  Extremities: no edema, sensation and movement grossly intact  Skin: warm, dry, appropriate color

## 2021-11-27 NOTE — H&P ADULT - NSHPLABSRESULTS_GEN_ALL_CORE
----------  LABORATORY DATA:  ----------                        13.6   9.51  )-----------( 167      ( 26 Nov 2021 20:58 )             40.4               11-26    139  |  101  |  14  ----------------------------<  103<H>  4.1   |  25  |  0.87    Ca    9.5      26 Nov 2021 20:58  Phos  2.7     11-26  Mg     2.10     11-26    TPro  7.4  /  Alb  4.4  /  TBili  0.6  /  DBili  x   /  AST  276<H>  /  ALT  549<H>  /  AlkPhos  52  11-26    LIVER FUNCTIONS - ( 26 Nov 2021 20:58 )  Alb: 4.4 g/dL / Pro: 7.4 g/dL / ALK PHOS: 52 U/L / ALT: 549 U/L / AST: 276 U/L / GGT: x           PT/INR - ( 26 Nov 2021 22:48 )   PT: 15.1 sec;   INR: 1.27 ratio         PTT - ( 26 Nov 2021 22:48 )  PTT:26.5 sec      < from: CT Abdomen and Pelvis w/ IV Cont (11.26.21 @ 22:23) >    IMPRESSION:    No obvious pulmonary embolism however evaluation is markedly limited due to extensive subcutaneous and intrapleural air as well as the patient's arms at their side.    Small bilateral pneumothoraxes.  Trace bilateral pleural effusions with associated atelectatic changes.    Prominent pneumomediastinum.  Moderate pneumoperitoneum.  Extensive bilateral chest and neck subcutaneous emphysema. Mild subcutaneous emphysema in the abdomen.    < end of copied text >

## 2021-11-27 NOTE — CONSULT NOTE ADULT - PROBLEM SELECTOR RECOMMENDATION 9
Bilateral PTX with SQ emphysema and pneumomediastinum in the setting of recent hiatal hernia repair  -Pt subjectively dyspneic but appears comfortable, 98% on RA  -SQ emphysema improving per wife at bedside   -Continue to observe, no intervention at present   -If patient develops hypoxia, respiratory distress would place chest tube  -Eventual re-imaging to ensure resolution Bilateral PTX with SQ emphysema and pneumomediastinum in the setting of recent hiatal hernia repair  -Pt subjectively dyspneic but appears comfortable, 98% on RA  -SQ emphysema improving per wife at bedside   -Continue to observe, no intervention at present   -If patient develops hypoxia, respiratory distress would place chest tube(s)  -Eventual re-imaging to ensure resolution

## 2021-11-27 NOTE — H&P ADULT - HISTORY OF PRESENT ILLNESS
46M pm GERD hiatal hernia s/p robot-assisted hiatal hernia repair 11/24 p/w post operative pain and chest tightness.    The patient reports he felt pain control was adequate before leaving the hospital 11/26. At home he tried liquid tylenol (100cc) which was too sweet for him to tolerate and could not take the full dose (at 6pm). The patient took oxycodone at 4:30pm and felt the pain relief 90min later, felt dizzy. He was also reporting feelings of chest tightness, difficulty taking full breaths. Due to concern that he would have trouble if he passed out at home he came to the hospital. At the hospital he was okay at rest without shortness of breath.    Denies f/n/c, chest pain, abdominal pain, n/v. He was tolerating soft foods like pudding and other liquids

## 2021-11-28 LAB
ALBUMIN SERPL ELPH-MCNC: 3.8 G/DL — SIGNIFICANT CHANGE UP (ref 3.3–5)
ALP SERPL-CCNC: 45 U/L — SIGNIFICANT CHANGE UP (ref 40–120)
ALT FLD-CCNC: 285 U/L — HIGH (ref 10–45)
ANION GAP SERPL CALC-SCNC: 11 MMOL/L — SIGNIFICANT CHANGE UP (ref 5–17)
AST SERPL-CCNC: 65 U/L — HIGH (ref 10–40)
BILIRUB SERPL-MCNC: 0.7 MG/DL — SIGNIFICANT CHANGE UP (ref 0.2–1.2)
BUN SERPL-MCNC: 14 MG/DL — SIGNIFICANT CHANGE UP (ref 7–23)
CALCIUM SERPL-MCNC: 9 MG/DL — SIGNIFICANT CHANGE UP (ref 8.4–10.5)
CHLORIDE SERPL-SCNC: 105 MMOL/L — SIGNIFICANT CHANGE UP (ref 96–108)
CO2 SERPL-SCNC: 24 MMOL/L — SIGNIFICANT CHANGE UP (ref 22–31)
COVID-19 NUCLEOCAPSID GAM AB INTERP: NEGATIVE — SIGNIFICANT CHANGE UP
COVID-19 NUCLEOCAPSID TOTAL GAM ANTIBODY RESULT: 0.08 INDEX — SIGNIFICANT CHANGE UP
COVID-19 SPIKE DOMAIN AB INTERP: POSITIVE
COVID-19 SPIKE DOMAIN ANTIBODY RESULT: 67 U/ML — HIGH
CREAT SERPL-MCNC: 0.84 MG/DL — SIGNIFICANT CHANGE UP (ref 0.5–1.3)
GLUCOSE SERPL-MCNC: 94 MG/DL — SIGNIFICANT CHANGE UP (ref 70–99)
HCT VFR BLD CALC: 37.1 % — LOW (ref 39–50)
HGB BLD-MCNC: 12.4 G/DL — LOW (ref 13–17)
MAGNESIUM SERPL-MCNC: 2 MG/DL — SIGNIFICANT CHANGE UP (ref 1.6–2.6)
MCHC RBC-ENTMCNC: 32.4 PG — SIGNIFICANT CHANGE UP (ref 27–34)
MCHC RBC-ENTMCNC: 33.4 GM/DL — SIGNIFICANT CHANGE UP (ref 32–36)
MCV RBC AUTO: 96.9 FL — SIGNIFICANT CHANGE UP (ref 80–100)
NRBC # BLD: 0 /100 WBCS — SIGNIFICANT CHANGE UP (ref 0–0)
PHOSPHATE SERPL-MCNC: 3.7 MG/DL — SIGNIFICANT CHANGE UP (ref 2.5–4.5)
PLATELET # BLD AUTO: 161 K/UL — SIGNIFICANT CHANGE UP (ref 150–400)
POTASSIUM SERPL-MCNC: 3.7 MMOL/L — SIGNIFICANT CHANGE UP (ref 3.5–5.3)
POTASSIUM SERPL-SCNC: 3.7 MMOL/L — SIGNIFICANT CHANGE UP (ref 3.5–5.3)
PROT SERPL-MCNC: 6.3 G/DL — SIGNIFICANT CHANGE UP (ref 6–8.3)
RBC # BLD: 3.83 M/UL — LOW (ref 4.2–5.8)
RBC # FLD: 12.1 % — SIGNIFICANT CHANGE UP (ref 10.3–14.5)
SARS-COV-2 IGG+IGM SERPL QL IA: 0.08 INDEX — SIGNIFICANT CHANGE UP
SARS-COV-2 IGG+IGM SERPL QL IA: 67 U/ML — HIGH
SARS-COV-2 IGG+IGM SERPL QL IA: NEGATIVE — SIGNIFICANT CHANGE UP
SARS-COV-2 IGG+IGM SERPL QL IA: POSITIVE
SODIUM SERPL-SCNC: 140 MMOL/L — SIGNIFICANT CHANGE UP (ref 135–145)
WBC # BLD: 4.86 K/UL — SIGNIFICANT CHANGE UP (ref 3.8–10.5)
WBC # FLD AUTO: 4.86 K/UL — SIGNIFICANT CHANGE UP (ref 3.8–10.5)

## 2021-11-28 PROCEDURE — 71045 X-RAY EXAM CHEST 1 VIEW: CPT | Mod: 26

## 2021-11-28 RX ADMIN — ENOXAPARIN SODIUM 40 MILLIGRAM(S): 100 INJECTION SUBCUTANEOUS at 06:18

## 2021-11-28 RX ADMIN — Medication 3 MILLIGRAM(S): at 21:06

## 2021-11-28 NOTE — PROGRESS NOTE ADULT - SUBJECTIVE AND OBJECTIVE BOX
[facility]  Sai Redding PA-C  [address] [place] [address]  [phone]      INTERVAL HPI/OVERNIGHT EVENTS:  pt seen and examined, doing well. States his breathing/pain are improved.   Tolerating diet, no N/V/D    MEDICATIONS  (STANDING):  enoxaparin Injectable 40 milliGRAM(s) SubCutaneous every 24 hours  influenza   Vaccine 0.5 milliLiter(s) IntraMuscular once  ketorolac   Injectable 15 milliGRAM(s) IV Push every 6 hours  melatonin 3 milliGRAM(s) Oral at bedtime  pantoprazole  Injectable 40 milliGRAM(s) IV Push daily    MEDICATIONS  (PRN):  traMADol 25 milliGRAM(s) Oral every 6 hours PRN Moderate Pain (4 - 6)  traMADol 50 milliGRAM(s) Oral every 6 hours PRN Severe Pain (7 - 10)      Allergies    No Known Allergies    Intolerances        ROS:   General:  No wt loss, fevers, chills, night sweats, fatigue,   Eyes:  Good vision, no reported pain  ENT:  No sore throat, pain, runny nose, dysphagia  CV:  No pain, palpitations, hypo/hypertension  Resp:  No dyspnea, cough, tachypnea, wheezing  GI:  No pain, No nausea, No vomiting, No diarrhea, No constipation, No weight loss, No fever, No pruritis, No rectal bleeding, No tarry stools, No dysphagia,  :  No pain, bleeding, incontinence, nocturia  Muscle:  No pain, weakness  Neuro:  No weakness, tingling, memory problems  Psych:  No fatigue, insomnia, mood problems, depression  Endocrine:  No polyuria, polydipsia, cold/heat intolerance  Heme:  No petechiae, ecchymosis, easy bruisability  Skin:  No rash, tattoos, scars, edema      PHYSICAL EXAM:   Vital Signs:  Vital Signs Last 24 Hrs  T(C): 36 (28 Nov 2021 09:15), Max: 37.1 (27 Nov 2021 16:55)  T(F): 96.8 (28 Nov 2021 09:15), Max: 98.8 (27 Nov 2021 16:55)  HR: 74 (28 Nov 2021 09:15) (69 - 80)  BP: 120/77 (28 Nov 2021 09:15) (120/77 - 128/75)  BP(mean): --  RR: 18 (28 Nov 2021 09:15) (18 - 18)  SpO2: 99% (28 Nov 2021 09:15) (97% - 99%)  Daily     Daily     GENERAL:  Appears stated age,   HEENT:  NC/AT,    CHEST:  Full & symmetric excursion,   HEART:  Regular rhythm,  ABDOMEN:  Soft, non-tender, non-distended,  EXTEREMITIES:  no cyanosis  SKIN:  No rash  NEURO:  Alert,       LABS:                        12.4   4.86  )-----------( 161      ( 28 Nov 2021 07:15 )             37.1     11-28    140  |  105  |  14  ----------------------------<  94  3.7   |  24  |  0.84    Ca    9.0      28 Nov 2021 07:11  Phos  3.7     11-28  Mg     2.0     11-28    TPro  6.3  /  Alb  3.8  /  TBili  0.7  /  DBili  x   /  AST  65<H>  /  ALT  285<H>  /  AlkPhos  45  11-28    PT/INR - ( 26 Nov 2021 22:48 )   PT: 15.1 sec;   INR: 1.27 ratio         PTT - ( 26 Nov 2021 22:48 )  PTT:26.5 sec      RADIOLOGY & ADDITIONAL TESTS:

## 2021-11-28 NOTE — PROGRESS NOTE ADULT - SUBJECTIVE AND OBJECTIVE BOX
Surgery Progress Note     Subjective/24hour Events:   Patient seen and examined at bedside on AM rounds. No acute events overnight.     Vital Signs:  Vital Signs Last 24 Hrs  T(C): 36.9 (28 Nov 2021 00:30), Max: 37.1 (27 Nov 2021 10:03)  T(F): 98.4 (28 Nov 2021 00:30), Max: 98.8 (27 Nov 2021 16:55)  HR: 75 (28 Nov 2021 00:30) (73 - 87)  BP: 126/86 (28 Nov 2021 00:30) (123/69 - 147/92)  BP(mean): --  RR: 18 (28 Nov 2021 00:30) (18 - 19)  SpO2: 98% (28 Nov 2021 00:30) (97% - 100%)    CAPILLARY BLOOD GLUCOSE          I&O's Detail    26 Nov 2021 07:01  -  27 Nov 2021 07:00  --------------------------------------------------------  IN:  Total IN: 0 mL    OUT:    Voided (mL): 200 mL  Total OUT: 200 mL    Total NET: -200 mL      27 Nov 2021 07:01  -  28 Nov 2021 04:00  --------------------------------------------------------  IN:    Oral Fluid: 700 mL  Total IN: 700 mL    OUT:    Voided (mL): 1300 mL  Total OUT: 1300 mL    Total NET: -600 mL          MEDICATIONS  (STANDING):  enoxaparin Injectable 40 milliGRAM(s) SubCutaneous every 24 hours  influenza   Vaccine 0.5 milliLiter(s) IntraMuscular once  ketorolac   Injectable 15 milliGRAM(s) IV Push every 6 hours  melatonin 3 milliGRAM(s) Oral at bedtime  pantoprazole  Injectable 40 milliGRAM(s) IV Push daily    MEDICATIONS  (PRN):  traMADol 25 milliGRAM(s) Oral every 6 hours PRN Moderate Pain (4 - 6)  traMADol 50 milliGRAM(s) Oral every 6 hours PRN Severe Pain (7 - 10)      Physical Exam:  Gen: NAD, resting comfortably in bed  CV: Regular rate  Resp: Nonlabored breathing on room air  Back and neck: Some palpable subcutaneous emphysema *** (modify based on physical exam in AM)  Abd: Soft, nondistended, nontender, port sites are intact with no drainage or dehiscence  Ext: Moves all 4 spontaneously, warm and well perfused    Labs:    11-27    140  |  102  |  15  ----------------------------<  115<H>  3.9   |  25  |  0.81    Ca    9.5      27 Nov 2021 09:27  Phos  3.4     11-27  Mg     1.9     11-27    TPro  7.0  /  Alb  4.4  /  TBili  0.7  /  DBili  x   /  AST  168<H>  /  ALT  446<H>  /  AlkPhos  49  11-27    LIVER FUNCTIONS - ( 27 Nov 2021 09:27 )  Alb: 4.4 g/dL / Pro: 7.0 g/dL / ALK PHOS: 49 U/L / ALT: 446 U/L / AST: 168 U/L / GGT: x                                 13.4   6.68  )-----------( 178      ( 27 Nov 2021 09:27 )             40.8     PT/INR - ( 26 Nov 2021 22:48 )   PT: 15.1 sec;   INR: 1.27 ratio         PTT - ( 26 Nov 2021 22:48 )  PTT:26.5 sec     Surgery Progress Note     Subjective/24hour Events:   Patient seen and examined at bedside on AM rounds. No acute events overnight.     Vital Signs:  Vital Signs Last 24 Hrs  T(C): 36.9 (28 Nov 2021 00:30), Max: 37.1 (27 Nov 2021 10:03)  T(F): 98.4 (28 Nov 2021 00:30), Max: 98.8 (27 Nov 2021 16:55)  HR: 75 (28 Nov 2021 00:30) (73 - 87)  BP: 126/86 (28 Nov 2021 00:30) (123/69 - 147/92)  BP(mean): --  RR: 18 (28 Nov 2021 00:30) (18 - 19)  SpO2: 98% (28 Nov 2021 00:30) (97% - 100%)    CAPILLARY BLOOD GLUCOSE          I&O's Detail    26 Nov 2021 07:01  -  27 Nov 2021 07:00  --------------------------------------------------------  IN:  Total IN: 0 mL    OUT:    Voided (mL): 200 mL  Total OUT: 200 mL    Total NET: -200 mL      27 Nov 2021 07:01  -  28 Nov 2021 04:00  --------------------------------------------------------  IN:    Oral Fluid: 700 mL  Total IN: 700 mL    OUT:    Voided (mL): 1300 mL  Total OUT: 1300 mL    Total NET: -600 mL          MEDICATIONS  (STANDING):  enoxaparin Injectable 40 milliGRAM(s) SubCutaneous every 24 hours  influenza   Vaccine 0.5 milliLiter(s) IntraMuscular once  ketorolac   Injectable 15 milliGRAM(s) IV Push every 6 hours  melatonin 3 milliGRAM(s) Oral at bedtime  pantoprazole  Injectable 40 milliGRAM(s) IV Push daily    MEDICATIONS  (PRN):  traMADol 25 milliGRAM(s) Oral every 6 hours PRN Moderate Pain (4 - 6)  traMADol 50 milliGRAM(s) Oral every 6 hours PRN Severe Pain (7 - 10)      Physical Exam:  Gen: NAD, resting comfortably in bed  CV: Regular rate  Resp: Nonlabored breathing on room air  Back and neck: minimal subcutaneous emphysema noted   Abd: Soft, nondistended, nontender, port sites are intact with no drainage or dehiscence  Ext: Moves all 4 spontaneously, warm and well perfused    Labs:    11-27    140  |  102  |  15  ----------------------------<  115<H>  3.9   |  25  |  0.81    Ca    9.5      27 Nov 2021 09:27  Phos  3.4     11-27  Mg     1.9     11-27    TPro  7.0  /  Alb  4.4  /  TBili  0.7  /  DBili  x   /  AST  168<H>  /  ALT  446<H>  /  AlkPhos  49  11-27    LIVER FUNCTIONS - ( 27 Nov 2021 09:27 )  Alb: 4.4 g/dL / Pro: 7.0 g/dL / ALK PHOS: 49 U/L / ALT: 446 U/L / AST: 168 U/L / GGT: x                                 13.4   6.68  )-----------( 178      ( 27 Nov 2021 09:27 )             40.8     PT/INR - ( 26 Nov 2021 22:48 )   PT: 15.1 sec;   INR: 1.27 ratio         PTT - ( 26 Nov 2021 22:48 )  PTT:26.5 sec

## 2021-11-29 ENCOUNTER — TRANSCRIPTION ENCOUNTER (OUTPATIENT)
Age: 46
End: 2021-11-29

## 2021-11-29 VITALS
DIASTOLIC BLOOD PRESSURE: 73 MMHG | HEART RATE: 77 BPM | TEMPERATURE: 98 F | RESPIRATION RATE: 18 BRPM | OXYGEN SATURATION: 98 % | SYSTOLIC BLOOD PRESSURE: 133 MMHG

## 2021-11-29 LAB
ALBUMIN SERPL ELPH-MCNC: 3.8 G/DL — SIGNIFICANT CHANGE UP (ref 3.3–5)
ALP SERPL-CCNC: 52 U/L — SIGNIFICANT CHANGE UP (ref 40–120)
ALT FLD-CCNC: 212 U/L — HIGH (ref 10–45)
ANION GAP SERPL CALC-SCNC: 11 MMOL/L — SIGNIFICANT CHANGE UP (ref 5–17)
AST SERPL-CCNC: 30 U/L — SIGNIFICANT CHANGE UP (ref 10–40)
BILIRUB SERPL-MCNC: 0.6 MG/DL — SIGNIFICANT CHANGE UP (ref 0.2–1.2)
BUN SERPL-MCNC: 12 MG/DL — SIGNIFICANT CHANGE UP (ref 7–23)
CALCIUM SERPL-MCNC: 8.9 MG/DL — SIGNIFICANT CHANGE UP (ref 8.4–10.5)
CHLORIDE SERPL-SCNC: 103 MMOL/L — SIGNIFICANT CHANGE UP (ref 96–108)
CO2 SERPL-SCNC: 24 MMOL/L — SIGNIFICANT CHANGE UP (ref 22–31)
CREAT SERPL-MCNC: 0.85 MG/DL — SIGNIFICANT CHANGE UP (ref 0.5–1.3)
GLUCOSE SERPL-MCNC: 92 MG/DL — SIGNIFICANT CHANGE UP (ref 70–99)
HCT VFR BLD CALC: 39.9 % — SIGNIFICANT CHANGE UP (ref 39–50)
HGB BLD-MCNC: 13.4 G/DL — SIGNIFICANT CHANGE UP (ref 13–17)
MCHC RBC-ENTMCNC: 32.5 PG — SIGNIFICANT CHANGE UP (ref 27–34)
MCHC RBC-ENTMCNC: 33.6 GM/DL — SIGNIFICANT CHANGE UP (ref 32–36)
MCV RBC AUTO: 96.8 FL — SIGNIFICANT CHANGE UP (ref 80–100)
NRBC # BLD: 0 /100 WBCS — SIGNIFICANT CHANGE UP (ref 0–0)
PLATELET # BLD AUTO: 194 K/UL — SIGNIFICANT CHANGE UP (ref 150–400)
POTASSIUM SERPL-MCNC: 3.8 MMOL/L — SIGNIFICANT CHANGE UP (ref 3.5–5.3)
POTASSIUM SERPL-SCNC: 3.8 MMOL/L — SIGNIFICANT CHANGE UP (ref 3.5–5.3)
PROT SERPL-MCNC: 6.6 G/DL — SIGNIFICANT CHANGE UP (ref 6–8.3)
RBC # BLD: 4.12 M/UL — LOW (ref 4.2–5.8)
RBC # FLD: 11.9 % — SIGNIFICANT CHANGE UP (ref 10.3–14.5)
SARS-COV-2 RNA SPEC QL NAA+PROBE: SIGNIFICANT CHANGE UP
SODIUM SERPL-SCNC: 138 MMOL/L — SIGNIFICANT CHANGE UP (ref 135–145)
WBC # BLD: 5.33 K/UL — SIGNIFICANT CHANGE UP (ref 3.8–10.5)
WBC # FLD AUTO: 5.33 K/UL — SIGNIFICANT CHANGE UP (ref 3.8–10.5)

## 2021-11-29 PROCEDURE — 36415 COLL VENOUS BLD VENIPUNCTURE: CPT

## 2021-11-29 PROCEDURE — 71045 X-RAY EXAM CHEST 1 VIEW: CPT | Mod: 26

## 2021-11-29 PROCEDURE — 85610 PROTHROMBIN TIME: CPT

## 2021-11-29 PROCEDURE — 86900 BLOOD TYPING SEROLOGIC ABO: CPT

## 2021-11-29 PROCEDURE — 86901 BLOOD TYPING SEROLOGIC RH(D): CPT

## 2021-11-29 PROCEDURE — 86769 SARS-COV-2 COVID-19 ANTIBODY: CPT

## 2021-11-29 PROCEDURE — U0005: CPT

## 2021-11-29 PROCEDURE — 99285 EMERGENCY DEPT VISIT HI MDM: CPT

## 2021-11-29 PROCEDURE — 86850 RBC ANTIBODY SCREEN: CPT

## 2021-11-29 PROCEDURE — U0003: CPT

## 2021-11-29 PROCEDURE — 36000 PLACE NEEDLE IN VEIN: CPT

## 2021-11-29 PROCEDURE — 85025 COMPLETE CBC W/AUTO DIFF WBC: CPT

## 2021-11-29 PROCEDURE — 80053 COMPREHEN METABOLIC PANEL: CPT

## 2021-11-29 PROCEDURE — 71045 X-RAY EXAM CHEST 1 VIEW: CPT

## 2021-11-29 PROCEDURE — 71275 CT ANGIOGRAPHY CHEST: CPT | Mod: MA

## 2021-11-29 PROCEDURE — 74177 CT ABD & PELVIS W/CONTRAST: CPT | Mod: MA

## 2021-11-29 PROCEDURE — 85730 THROMBOPLASTIN TIME PARTIAL: CPT

## 2021-11-29 PROCEDURE — 71046 X-RAY EXAM CHEST 2 VIEWS: CPT

## 2021-11-29 PROCEDURE — 83735 ASSAY OF MAGNESIUM: CPT

## 2021-11-29 PROCEDURE — 85027 COMPLETE CBC AUTOMATED: CPT

## 2021-11-29 PROCEDURE — 84100 ASSAY OF PHOSPHORUS: CPT

## 2021-11-29 RX ORDER — POTASSIUM CHLORIDE 20 MEQ
20 PACKET (EA) ORAL ONCE
Refills: 0 | Status: COMPLETED | OUTPATIENT
Start: 2021-11-29 | End: 2021-11-29

## 2021-11-29 RX ORDER — TRAMADOL HYDROCHLORIDE 50 MG/1
1 TABLET ORAL
Qty: 5 | Refills: 0
Start: 2021-11-29

## 2021-11-29 RX ORDER — LANOLIN ALCOHOL/MO/W.PET/CERES
1 CREAM (GRAM) TOPICAL
Qty: 0 | Refills: 0 | DISCHARGE
Start: 2021-11-29

## 2021-11-29 RX ADMIN — Medication 20 MILLIEQUIVALENT(S): at 12:36

## 2021-11-29 RX ADMIN — ENOXAPARIN SODIUM 40 MILLIGRAM(S): 100 INJECTION SUBCUTANEOUS at 05:07

## 2021-11-29 NOTE — DISCHARGE NOTE PROVIDER - NSDCMRMEDTOKEN_GEN_ALL_CORE_FT
melatonin 3 mg oral tablet: 1 tab(s) orally once a day (at bedtime)  traMADol 50 mg oral tablet: 1 tab(s) orally every 6 hours, As Needed -for severe pain MDD:4 tabs   Vitamin B12 injection weekly (given to patient by his hematologist:   Vitamin D 2000 IU orally daily:

## 2021-11-29 NOTE — SBIRT NOTE ADULT - NSSBIRTUNABLESCROTHER_GEN_A_CORE
Patient declined to complete AUDIT. He reported that the information on the patent profile is incorrect. He has 1-2 drinks at most 1-2 times a week, and never more than 6 at a time. He declined offer of resources.

## 2021-11-29 NOTE — PROGRESS NOTE ADULT - ATTENDING COMMENTS
pt seen and examined  pt chart and imaging reviewed in detail  agree with note above  pod 4 s/p lap robotic HHR, toupet fundoplication, intraop-EGD - was discharged from [facility] yesterday afternoon, had mild SOB and anxiety  @ home, didn't like taste of liquid tylenol and felt oxycodone made him lightheaded and pt returned to [facility] ER c/o mild chest pain on deep inspiration  CTPA negative for PE, + small bilateral ptx, + pneumomediastinum/pneumoperitoneum likely related to positive pressure CO2 insufflation/airSeal.   no other signs of ascites/fluid collection  tolerating fulls no carbonated beverages  no acute surgical intervention at this time  pt seen walking hallway without any SOB, breathing comfortably  crepitus improving,   abd soft, nt, nd no r/g  wounds c/d/i   transaminases decreasing likely secondary to tylenol  f/u labs in am   continue to observe for now, rpt cxr in am  appreciate GI/pulm consults  likely dc home in am if continues to improve.   d/w pt & family @ bedside in detail .
pt seen and examined  pt chart and imaging reviewed in detail  agree with note above  pod 5 s/p lap robotic HHR, toupet fundoplication, intraop-EGD - was discharged from [facility] 11/26 afternoon, had mild SOB and anxiety  @ home, didn't like taste of liquid tylenol and felt oxycodone made him lightheaded and pt returned to [facility] ER c/o mild chest pain on deep inspiration  CTPA negative for PE, + small bilateral ptx, + pneumomediastinum/pneumoperitoneum likely related to positive pressure CO2 insufflation/airSeal.   no other signs of ascites/fluid collection  tolerating fulls no carbonated beverages  no acute surgical intervention at this time  pt seen walking hallway without any SOB, breathing comfortably  crepitus improving,   abd soft, nt, nd no r/g  wounds c/d/i   transaminases decreasing likely secondary to tylenol  f/u labs in am   continue to observe for now, rpt cxr in am  appreciate GI/pulm consults  likely dc home in pm if pulmonary in agreement  f/u as outpt 1-2 weeks.   d/w pt & RN @ bedside in detail .
pt clinically better  ptx improving  pt ok for dc from pulm perspective  fu in office

## 2021-11-29 NOTE — PROGRESS NOTE ADULT - ASSESSMENT
46M pmh GERD hiatal hernia s/p robot-assisted hiatal hernia repair 11/24 p/w post operative pain and chest tightness.    pneumomediastinum  pneumoperitoneum  pt know to our practice, follows with Dr. Grey  pt d/sherman from [facility] 11/26  s/p hiatal hernia repair 11/24  CT demonstrating Prominent pneumomediastinum and Moderate pneumoperitoneum  admitted to surgery   pain control as per sx  diet as per surgery   LFTs elevated, improving, cont hold tylenol  PPI  dc planning today as per sx    Advanced care planning was discussed with patient and family.  Advanced care planning forms were reviewed and discussed.  Risks, benefits and alternatives of gastroenterologic procedures were discussed in detail and all questions were answered.    30 minutes spent. 
47 y/o M with PMH of GERD s/p robot-assisted hiatal hernia repair 11/24 p/w post operative pain and chest tightness. Imaging with pneumomediastinum, pneumothoraces, subcutaneous emphysema. 98% on RA
46M s/p hiatal hernia repair robot-assisted on 11/26 with stable- appearing pneumoperitoneum, pneumothoraces on CT imaging. Re-admited for observation and pain management.    Plan:  - Continue pain control, hold tylenol given transaminase elevation  - FLD  - PPI  - Appreciate pulmonary + GI recs  - DVT ppx: LVX      [facility]  [id number]
46M s/p hiatal hernia repair robot-assisted on 11/26 with stable- appearing pneumoperitoneum, pneumothoraces on CT imaging. Re-admited for observation and pain management.    Plan:  - Continue pain control, hold tylenol given transaminase elevation  - FLD  - PPI  - Appreciate pulmonary + GI recs  - DVT ppx: LVX  -d/c home today      [facility]  [id number]
 46M pmh GERD hiatal hernia s/p robot-assisted hiatal hernia repair 11/24 p/w post operative pain and chest tightness.    pneumomediastinum  pneumoperitoneum  pt know to our practice, follows with Dr. Grey  pt d/sherman from [facility] 11/26  s/p hiatal hernia repair 11/24  CT demonstrating Prominent pneumomediastinum and Moderate pneumoperitoneum  admitted to surgery   pain control as per sx  diet as per surgery   LFTs elevated, improving, cont hold tylenol  PPI  will follow    Advanced care planning was discussed with patient and family.  Advanced care planning forms were reviewed and discussed.  Risks, benefits and alternatives of gastroenterologic procedures were discussed in detail and all questions were answered.    30 minutes spent.

## 2021-11-29 NOTE — PROGRESS NOTE ADULT - PROBLEM SELECTOR PLAN 1
CT chest with small bilateral PTX with SQ emphysema and pneumomediastinum in the setting of recent hiatal hernia repair  -Pt subjectively dyspneic but appears comfortable, 98% on RA  -SQ emphysema improving  -Continue to observe, no intervention at present   -Monitor for change in respiratory status   -CXR this AM reviewed, unable to clearly visualize PTX, decreased free air at diaphragm, still with SQ emphysema but improving, f/u official report  -Eventual repeat imaging to ensure resolution  -D/c planning per primary team. CT chest with small bilateral PTX with SQ emphysema and pneumomediastinum in the setting of recent hiatal hernia repair  -Pt subjectively dyspneic but appears comfortable, 98% on RA  -SQ emphysema improving  -Continue to observe, no intervention at present   -Monitor for change in respiratory status   -CXR this AM reviewed, unable to clearly visualize PTX, decreased free air at diaphragm, still with SQ emphysema but improving, f/u official report  -Eventual repeat imaging to ensure resolution  -D/c planning per primary team  -Can follow up with Dr. Troy in office in 2 weeks. CT chest with small bilateral PTX with SQ emphysema and pneumomediastinum in the setting of recent hiatal hernia repair  -Pt subjectively dyspneic but appears comfortable, 98% on RA  -SQ emphysema improving  -Continue to observe, no intervention at present   -Monitor for change in respiratory status   -CXR this AM reviewed, ptx better decreased free air at diaphragm, still with SQ emphysema but improving, f/u official report  -Eventual repeat imaging to ensure resolution  -D/c planning per primary team  -Can follow up with Dr. Troy in office in 2 weeks.

## 2021-11-29 NOTE — DISCHARGE NOTE PROVIDER - CARE PROVIDER_API CALL
Owen Shin)  Surgery  [address], [address]  [place] [address]  Phone: [phone]  Fax: [phone]  Follow Up Time: 1 week   Owen Shin)  Surgery  [address], [address]  [place] [address]  Phone: [phone]  Fax: [phone]  Follow Up Time: 1 week    Ck Troy)  Critical Care Medicine  [address], [address]  [place] [address]  Phone: [phone]  Fax: [phone]  Follow Up Time: 2 weeks

## 2021-11-29 NOTE — PROGRESS NOTE ADULT - SUBJECTIVE AND OBJECTIVE BOX
Follow-up Pulm Progress Note    O2 sats 98% RA  Still with subq air but improving  Denies CP, SOB    Medications:  MEDICATIONS  (STANDING):  enoxaparin Injectable 40 milliGRAM(s) SubCutaneous every 24 hours  influenza   Vaccine 0.5 milliLiter(s) IntraMuscular once  ketorolac   Injectable 15 milliGRAM(s) IV Push every 6 hours  melatonin 3 milliGRAM(s) Oral at bedtime  pantoprazole  Injectable 40 milliGRAM(s) IV Push daily    MEDICATIONS  (PRN):  traMADol 25 milliGRAM(s) Oral every 6 hours PRN Moderate Pain (4 - 6)  traMADol 50 milliGRAM(s) Oral every 6 hours PRN Severe Pain (7 - 10)    Vital Signs Last 24 Hrs  T(C): 37 (29 Nov 2021 09:31), Max: 37.2 (28 Nov 2021 13:33)  T(F): 98.6 (29 Nov 2021 09:31), Max: 98.9 (28 Nov 2021 13:33)  HR: 68 (29 Nov 2021 09:31) (64 - 81)  BP: 122/75 (29 Nov 2021 09:31) (116/76 - 132/91)  BP(mean): --  RR: 18 (29 Nov 2021 09:31) (18 - 18)  SpO2: 97% (29 Nov 2021 09:31) (95% - 98%)    11-28 @ 07:01  -  11-29 @ 07:00  --------------------------------------------------------  IN: 610 mL / OUT: 2500 mL / NET: -1890 mL    LABS:                        13.4   5.33  )-----------( 194      ( 29 Nov 2021 07:37 )             39.9     11-29    138  |  103  |  12  ----------------------------<  92  3.8   |  24  |  0.85    Ca    8.9      29 Nov 2021 07:35  Phos  3.7     11-28  Mg     2.0     11-28    TPro  6.6  /  Alb  3.8  /  TBili  0.6  /  DBili  x   /  AST  30  /  ALT  212<H>  /  AlkPhos  52  11-29    Physical Examination:  PULM: Clear to auscultation bilaterally, no significant sputum production  CVS: RRR    RADIOLOGY REVIEWED    CT chest: ct< from: CT Angio Chest PE Protocol w/ IV Cont (11.26.21 @ 22:08) >  INDINGS:  CHEST:  LUNGS PLEURA AND LARGE AIRWAYS: Patent central airways. Small bilateral pneumothoraces. Trace bilateral pleural fluid with mild dependent atelectatic changes.  VESSELS: Limited evaluation due to motion and poor opacification of distal branches. No main, right or left pulmonary artery embolism.  HEART: Heart size is normal. No pericardial effusion.  MEDIASTINUM AND AILEEN: Large volume of pneumomediastinum. CHEST WALL AND LOWER NECK: Large volume of subcutaneous emphysema in the bilateral chest extending into the neck.    ABDOMEN AND PELVIS:  LIVER: Within normal limits.  BILE DUCTS: Normal caliber.  GALLBLADDER: Within normal limits.  SPLEEN: Within normal limits.  PANCREAS: Within normal limits.  ADRENALS: Within normal limits.  KIDNEYS/URETERS: Within normal limits.    BLADDER: Small foci of air likely from recent instrumentation otherwise normal.  REPRODUCTIVE ORGANS: Prostate within normal limits.    BOWEL: No bowel obstruction. Appendix is normal. No hiatal hernia.  PERITONEUM: No ascites. Moderate pneumoperitoneum.  VESSELS: Within normal limits.  RETROPERITONEUM/LYMPH NODES: No lymphadenopathy.  ABDOMINAL WALL: Mild amount of subcutaneous air.  BONES: Within normal limits.    IMPRESSION:    No obvious pulmonary embolism however evaluation is markedly limited due to extensive subcutaneous and intrapleural air as well as the patient's arms at their side.    Small bilateral pneumothoraxes.    < end of copied text >

## 2021-11-29 NOTE — DISCHARGE NOTE NURSING/CASE MANAGEMENT/SOCIAL WORK - PATIENT PORTAL LINK FT
You can access the FollowMyHealth Patient Portal offered by [facility] by registering at the following website: [email or web address]. By joining [facility]’s FollowMyHealth portal, you will also be able to view your health information using other applications (apps) compatible with our system.

## 2021-11-29 NOTE — PROGRESS NOTE ADULT - PROBLEM SELECTOR PLAN 2
CT chest with small bilateral PTX with SQ emphysema and pneumomediastinum in the setting of recent hiatal hernia repair  -Contineu to observe, no intervention at present  -Eventual repeat imaging.

## 2021-11-29 NOTE — DISCHARGE NOTE PROVIDER - NSDCFUADDINST_GEN_ALL_CORE_FT
WOUND CARE: Steri-strips have been applied to your incisions.  Do not remove these.  They will fall off as they get wet; in approximately 7-10 days.  BATHING: Please do not submerge wound underwater. You may shower and/or sponge bathe.  ACTIVITY: No heavy lifting or straining. Otherwise, you may return to your usual level of physical activity. If you are taking narcotic pain medication (such as Percocet), do NOT drive a car, operate machinery or make important decisions.  DIET: Return to your usual diet.  NOTIFY YOUR SURGEON IF: You have any bleeding that does not stop, any pus draining from your wound, any fever (over 100.4 F) or chills, persistent nausea/vomiting, persistent diarrhea, or if your pain is not controlled on your discharge pain medications.  FOLLOW-UP:  1. Please call to make a follow-up appointment with Dr. Shin within one week of discharge   2. Please follow up with your primary care physician in one week regarding your hospitalization.

## 2021-11-29 NOTE — PROGRESS NOTE ADULT - SUBJECTIVE AND OBJECTIVE BOX
Surgery Red Team Daily Progress Note   VELIA REAVES | [id number]  --------------------------------------------------------------------------------------------------------------------  SUBJECTIVE / 24H EVENTS  Patient seen and examined on morning rounds. No acute events overnight.  --------------------------------------------------------------------------------------------------------------------  OBJECTIVE:    VITAL SIGNS:  T(C): 36.9 (11-28-21 @ 20:48), Max: 37.2 (11-28-21 @ 13:33)  HR: 64 (11-28-21 @ 20:48) (64 - 81)  BP: 124/77 (11-28-21 @ 20:48) (120/77 - 132/91)  RR: 18 (11-28-21 @ 20:48) (18 - 18)  SpO2: 98% (11-28-21 @ 20:48) (95% - 99%)  Daily     Daily       PHYSICAL EXAM:  Gen: NAD  LS: Respirations unlabored.   Card: RRR.   GI: Soft. Nontender. Nondistended.  Ext: Warm, well perfused      11-27-21 @ 07:01  -  11-28-21 @ 07:00  --------------------------------------------------------  IN:    Oral Fluid: 700 mL  Total IN: 700 mL    OUT:    Voided (mL): 1800 mL  Total OUT: 1800 mL    Total NET: -1100 mL      11-28-21 @ 07:01  -  11-29-21 @ 00:27  --------------------------------------------------------  IN:    Oral Fluid: 610 mL  Total IN: 610 mL    OUT:    Voided (mL): 2100 mL  Total OUT: 2100 mL    Total NET: -1490 mL          LAB VALUES:  11-28    140  |  105  |  14  ----------------------------<  94  3.7   |  24  |  0.84    Ca    9.0      28 Nov 2021 07:11  Phos  3.7     11-28  Mg     2.0     11-28    TPro  6.3  /  Alb  3.8  /  TBili  0.7  /  DBili  x   /  AST  65<H>  /  ALT  285<H>  /  AlkPhos  45  11-28                               12.4   4.86  )-----------( 161      ( 28 Nov 2021 07:15 )             37.1     LIVER FUNCTIONS - ( 28 Nov 2021 07:11 )  Alb: 3.8 g/dL / Pro: 6.3 g/dL / ALK PHOS: 45 U/L / ALT: 285 U/L / AST: 65 U/L / GGT: x               MICROBIOLOGY:    No new microbiology data for review.     RADIOLOGY:  PACS Image: Image(s) Available (11-28-21 @ 08:28)    No new radiographic images for review.    MEDICATIONS  (STANDING):  enoxaparin Injectable 40 milliGRAM(s) SubCutaneous every 24 hours  influenza   Vaccine 0.5 milliLiter(s) IntraMuscular once  ketorolac   Injectable 15 milliGRAM(s) IV Push every 6 hours  melatonin 3 milliGRAM(s) Oral at bedtime  pantoprazole  Injectable 40 milliGRAM(s) IV Push daily    MEDICATIONS  (PRN):  traMADol 25 milliGRAM(s) Oral every 6 hours PRN Moderate Pain (4 - 6)  traMADol 50 milliGRAM(s) Oral every 6 hours PRN Severe Pain (7 - 10)      Surgery Red Team Daily Progress Note   VELIA REAVES | [id number]  --------------------------------------------------------------------------------------------------------------------  SUBJECTIVE / 24H EVENTS  Patient seen and examined on morning rounds. No acute events overnight. pain is controlled. Tolerating diet. denies N/V  --------------------------------------------------------------------------------------------------------------------  OBJECTIVE:    VITAL SIGNS:  T(C): 36.9 (11-28-21 @ 20:48), Max: 37.2 (11-28-21 @ 13:33)  HR: 64 (11-28-21 @ 20:48) (64 - 81)  BP: 124/77 (11-28-21 @ 20:48) (120/77 - 132/91)  RR: 18 (11-28-21 @ 20:48) (18 - 18)  SpO2: 98% (11-28-21 @ 20:48) (95% - 99%)  Daily     Daily       PHYSICAL EXAM:  Gen: NAD  LS: Respirations unlabored.   Card: RRR.   GI: Soft. Nontender. Nondistended.  Ext: Warm, well perfused      11-27-21 @ 07:01  -  11-28-21 @ 07:00  --------------------------------------------------------  IN:    Oral Fluid: 700 mL  Total IN: 700 mL    OUT:    Voided (mL): 1800 mL  Total OUT: 1800 mL    Total NET: -1100 mL      11-28-21 @ 07:01  -  11-29-21 @ 00:27  --------------------------------------------------------  IN:    Oral Fluid: 610 mL  Total IN: 610 mL    OUT:    Voided (mL): 2100 mL  Total OUT: 2100 mL    Total NET: -1490 mL          LAB VALUES:  11-28    140  |  105  |  14  ----------------------------<  94  3.7   |  24  |  0.84    Ca    9.0      28 Nov 2021 07:11  Phos  3.7     11-28  Mg     2.0     11-28    TPro  6.3  /  Alb  3.8  /  TBili  0.7  /  DBili  x   /  AST  65<H>  /  ALT  285<H>  /  AlkPhos  45  11-28                               12.4   4.86  )-----------( 161      ( 28 Nov 2021 07:15 )             37.1     LIVER FUNCTIONS - ( 28 Nov 2021 07:11 )  Alb: 3.8 g/dL / Pro: 6.3 g/dL / ALK PHOS: 45 U/L / ALT: 285 U/L / AST: 65 U/L / GGT: x               MICROBIOLOGY:    No new microbiology data for review.     RADIOLOGY:  PACS Image: Image(s) Available (11-28-21 @ 08:28)    No new radiographic images for review.    MEDICATIONS  (STANDING):  enoxaparin Injectable 40 milliGRAM(s) SubCutaneous every 24 hours  influenza   Vaccine 0.5 milliLiter(s) IntraMuscular once  ketorolac   Injectable 15 milliGRAM(s) IV Push every 6 hours  melatonin 3 milliGRAM(s) Oral at bedtime  pantoprazole  Injectable 40 milliGRAM(s) IV Push daily    MEDICATIONS  (PRN):  traMADol 25 milliGRAM(s) Oral every 6 hours PRN Moderate Pain (4 - 6)  traMADol 50 milliGRAM(s) Oral every 6 hours PRN Severe Pain (7 - 10)

## 2021-11-29 NOTE — PROGRESS NOTE ADULT - SUBJECTIVE AND OBJECTIVE BOX
[facility]  Sai Redding PA-C  [facility] [place] [address]  [phone]      INTERVAL HPI/OVERNIGHT EVENTS:  pt seen and examined, doing well.   Tolerating diet, no N/V/D    MEDICATIONS  (STANDING):  enoxaparin Injectable 40 milliGRAM(s) SubCutaneous every 24 hours  influenza   Vaccine 0.5 milliLiter(s) IntraMuscular once  ketorolac   Injectable 15 milliGRAM(s) IV Push every 6 hours  melatonin 3 milliGRAM(s) Oral at bedtime  pantoprazole  Injectable 40 milliGRAM(s) IV Push daily    MEDICATIONS  (PRN):  traMADol 25 milliGRAM(s) Oral every 6 hours PRN Moderate Pain (4 - 6)  traMADol 50 milliGRAM(s) Oral every 6 hours PRN Severe Pain (7 - 10)      Allergies    No Known Allergies    Intolerances        ROS:   General:  No wt loss, fevers, chills, night sweats, fatigue,   Eyes:  Good vision, no reported pain  ENT:  No sore throat, pain, runny nose, dysphagia  CV:  No pain, palpitations, hypo/hypertension  Resp:  No dyspnea, cough, tachypnea, wheezing  GI:  No pain, No nausea, No vomiting, No diarrhea, No constipation, No weight loss, No fever, No pruritis, No rectal bleeding, No tarry stools, No dysphagia,  :  No pain, bleeding, incontinence, nocturia  Muscle:  No pain, weakness  Neuro:  No weakness, tingling, memory problems  Psych:  No fatigue, insomnia, mood problems, depression  Endocrine:  No polyuria, polydipsia, cold/heat intolerance  Heme:  No petechiae, ecchymosis, easy bruisability  Skin:  No rash, tattoos, scars, edema      PHYSICAL EXAM:   Vital Signs:  Vital Signs Last 24 Hrs  T(C): 36 (28 Nov 2021 09:15), Max: 37.1 (27 Nov 2021 16:55)  T(F): 96.8 (28 Nov 2021 09:15), Max: 98.8 (27 Nov 2021 16:55)  HR: 74 (28 Nov 2021 09:15) (69 - 80)  BP: 120/77 (28 Nov 2021 09:15) (120/77 - 128/75)  BP(mean): --  RR: 18 (28 Nov 2021 09:15) (18 - 18)  SpO2: 99% (28 Nov 2021 09:15) (97% - 99%)  Daily     Daily     GENERAL:  Appears stated age,   HEENT:  NC/AT,    CHEST:  Full & symmetric excursion,   HEART:  Regular rhythm,  ABDOMEN:  Soft, non-tender, non-distended,  EXTEREMITIES:  no cyanosis  SKIN:  No rash  NEURO:  Alert,       LABS:                        12.4   4.86  )-----------( 161      ( 28 Nov 2021 07:15 )             37.1     11-28    140  |  105  |  14  ----------------------------<  94  3.7   |  24  |  0.84    Ca    9.0      28 Nov 2021 07:11  Phos  3.7     11-28  Mg     2.0     11-28    TPro  6.3  /  Alb  3.8  /  TBili  0.7  /  DBili  x   /  AST  65<H>  /  ALT  285<H>  /  AlkPhos  45  11-28    PT/INR - ( 26 Nov 2021 22:48 )   PT: 15.1 sec;   INR: 1.27 ratio         PTT - ( 26 Nov 2021 22:48 )  PTT:26.5 sec      RADIOLOGY & ADDITIONAL TESTS:

## 2021-11-29 NOTE — DISCHARGE NOTE PROVIDER - HOSPITAL COURSE
46M pmh GERD hiatal hernia s/p robot-assisted hiatal hernia repair on 11/24 p/w post operative pain and chest tightness.    The patient reports he felt pain control was adequate before leaving the hospital on 11/26. At home he tried liquid tylenol (100cc) which was too sweet for him to tolerate and could not take the full dose (at 6pm). The patient took oxycodone at 4:30pm and felt the pain relief 90min later, felt dizzy. He was also reporting feelings of chest tightness, difficulty taking full breaths. Due to concern that he would have trouble if he passed out at home he came to the hospital. At the hospital he was okay at rest without shortness of breath.    CTPA negative for PE, + small bilateral ptx, + pneumomediastinum/pneumoperitoneum likely related to positive pressure CO2 insufflation/air Seal. His transaminases were elevated so Tylenol was held. Pain control provided with toradol and tramadol which the patient did not use much during this admission.    He was admitted for two days where his pain was controlled. He was evaluated by pulmonary medicine during the admission. Upon discharge, he had no difficulty breathing, his pain was controlled, he was ambulating and he was tolerating a full liquid diet.    He is instructed to follow up with Dr. Shin in clinic.

## 2021-11-29 NOTE — DISCHARGE NOTE PROVIDER - NSDCCPCAREPLAN_GEN_ALL_CORE_FT
PRINCIPAL DISCHARGE DIAGNOSIS  Diagnosis: Pneumothorax  Assessment and Plan of Treatment: Pain controlled and respiratory status monitored

## 2021-11-29 NOTE — DISCHARGE NOTE PROVIDER - PROVIDER TOKENS
PROVIDER:[TOKEN:[[id number]],FOLLOWUP:[1 week]] PROVIDER:[TOKEN:[[id number]],FOLLOWUP:[1 week]],PROVIDER:[TOKEN:[[id number]],FOLLOWUP:[2 weeks]]

## 2021-12-12 ENCOUNTER — TRANSCRIPTION ENCOUNTER (OUTPATIENT)
Age: 46
End: 2021-12-12

## 2022-07-22 NOTE — ED PROVIDER NOTE - NS ED MD DISPO ISOLATION TYPES
Minoxidil Counseling: Minoxidil is a topical medication which can increase blood flow where it is applied. It is uncertain how this medication increases hair growth. Side effects are uncommon and include stinging and allergic reactions. None

## 2022-10-20 NOTE — PATIENT PROFILE ADULT - FUNCTIONAL SCREEN CURRENT LEVEL: SWALLOWING (IF SCORE 2 OR MORE FOR ANY ITEM, CONSULT REHAB SERVICES), MLM)
Noted.  Colette Nettles PA-C 10/20/2022 9:59 AM      
The Service to Sports Med order in workqueue [id number]  requested on 10/8/2022 has been removed as, unable to contact. Ordering provider has been notified.    Please contact patient, if further communication is needed.  
0 = swallows foods/liquids without difficulty

## 2023-12-17 NOTE — DISCHARGE NOTE PROVIDER - CARE PROVIDERS DIRECT ADDRESSES
,[email or web address],DirectAddress_Unknown [phone]

## 2024-02-01 NOTE — ASU PATIENT PROFILE, ADULT - HARM RISK FACTORS
no Weight Units: pounds Is Retinoid Dermatitis Present?: No Cheilitis Aggressive Treatment: I recommended application of Vaseline or Aquaphor numerous times a day (as often as every hour) and before going to bed. I also prescribed a topical steroid for twice daily use. Kilograms Preamble Statement (Weight Entered In Details Tab): Reported Weight in kilograms: Xerosis Normal Treatment: I recommended application of Cetaphil or CeraVe numerous times a day going to bed to all dry areas. Female Completion Statement: After discussing her treatment course we decided to discontinue isotretinoin therapy at this time. I explained that she would need to continue her birth control methods for at least one month after the last dosage. She should also get a pregnancy test one month after the last dose. She shouldn't donate blood for one month after the last dose. She should call with any new symptoms of depression. Hypertriglyceridemia Monitoring: I explained this is common when taking isotretinoin. If this worsens they will contact us. Months Of Therapy Completed: 4 Use Therapeutic Ranged Or Therapeutic Target: please select Range or Target Hypertriglyceridemia Treatment: I explained this is common when taking isotretinoin. If this worsens they will contact us. They may try OTC ibuprofen. Xerosis Aggressive Treatment: I recommended application of Cetaphil or CeraVe numerous times a day going to bed to all dry areas. I also prescribed a topical steroid for twice daily use. Male Completion Statement: After discussing his treatment course we decided to discontinue isotretinoin therapy at this time. He shouldn't donate blood for one month after the last dose. He should call with any new symptoms of depression. Is Cheilitis Present?: Yes - Normal Treatment Retinoid Dermatitis Normal Treatment: I recommended more frequent application of Cetaphil or CeraVe to the areas of dermatitis. Nosebleeds Normal Treatment: I explained this is common when taking isotretinoin. I recommended saline mist in each nostril multiple times a day. If this worsens they will contact us. Headache Monitoring: I recommended monitoring the headaches for now. There is no evidence of increased intracranial pressure. They were instructed to call if the headaches are worsening. Next Month's Dosage: 40mg BID Retinoid Dermatitis Aggressive Treatment: I recommended more frequent application of Cetaphil or CeraVe to the areas of dermatitis. I also prescribed a topical steroid for twice daily use until the dermatitis resolves. Dosing Month 1 (Required For Cumulative Dosing): 40mg Daily Lower Range (In Mg/Kg): 120 Dosing Month 2 (Required For Cumulative Dosing): 60mg Daily Upper Range (In Mg/Kg): 150 Hypercholesterolemia Monitoring: I explained this is common when taking isotretinoin. We will monitor closely. Detail Level: Zone Target Cumulative Dosage (In Mg/Kg): 135 Comments: Starting month 5 Add Associated Diagnosis When Managing Medication Side Effects: Yes Cheilitis Normal Treatment: I recommended application of Vaseline or Aquaphor numerous times a day (as often as every hour) and before going to bed. What Is The Patient's Gender: Male Xerosis Normal Treatment: I recommended application of Cetaphil or CeraVe numerous times a day and before going to bed to all dry areas. Counseling Text: I reviewed the side effect in detail. Patient should get monthly blood tests, not donate blood, not drive at night if vision affected, and not share medication. Dosing Month 4 (Required For Cumulative Dosing): 30mg BID Xerosis Aggressive Treatment: I recommended application of Cetaphil or CeraVe numerous times a day and before going to bed to all dry areas. I also prescribed a topical steroid for twice daily use. Female Pregnancy Counseling Text: Female patients should also be on two forms of birth control while taking this medication and for one month after their last dose. Ipledge Number (Optional): [id number] Patient Weight (Optional But Required For Cumulative Dose-Numbers And Decimals Only): 152 Pounds Preamble Statement (Weight Entered In Details Tab): Reported Weight in pounds:

## 2024-07-17 ENCOUNTER — TRANSCRIPTION ENCOUNTER (OUTPATIENT)
Age: 49
End: 2024-07-17

## 2025-04-01 ENCOUNTER — APPOINTMENT (OUTPATIENT)
Dept: RADIOLOGY | Facility: HOSPITAL | Age: 50
End: 2025-04-01

## 2025-05-21 NOTE — H&P PST ADULT - NSALCOHOLTYPE_GEN__A_CORE_SD
Eat 3 meals per day, 4-5 hours apart. No meal skipping. Eat your snack 2-3 hours away from your meals.    Eat 30 grams of carbohydrate per meal, and no more than 15-20 grams per snack.    Continue exercise as able.    Complete 3-day food log and return completed log at the follow up appointment      
beer/wine